# Patient Record
Sex: FEMALE | Race: WHITE | NOT HISPANIC OR LATINO | Employment: OTHER | URBAN - METROPOLITAN AREA
[De-identification: names, ages, dates, MRNs, and addresses within clinical notes are randomized per-mention and may not be internally consistent; named-entity substitution may affect disease eponyms.]

---

## 2023-06-19 ENCOUNTER — TELEPHONE (OUTPATIENT)
Dept: PRIMARY CARE | Facility: CLINIC | Age: 73
End: 2023-06-19
Payer: MEDICARE

## 2023-06-20 DIAGNOSIS — R79.89 ABNORMAL TSH: ICD-10-CM

## 2023-06-20 DIAGNOSIS — E78.5 DYSLIPIDEMIA: Primary | ICD-10-CM

## 2023-06-20 DIAGNOSIS — R73.9 BORDERLINE HYPERGLYCEMIA: ICD-10-CM

## 2023-06-20 DIAGNOSIS — I10 BENIGN ESSENTIAL HYPERTENSION: ICD-10-CM

## 2023-06-20 DIAGNOSIS — E55.9 VITAMIN D DEFICIENCY: ICD-10-CM

## 2023-06-28 ENCOUNTER — TELEPHONE (OUTPATIENT)
Dept: PRIMARY CARE | Facility: CLINIC | Age: 73
End: 2023-06-28
Payer: MEDICARE

## 2023-06-30 DIAGNOSIS — E55.9 VITAMIN D DEFICIENCY: ICD-10-CM

## 2023-06-30 DIAGNOSIS — E78.5 DYSLIPIDEMIA: Primary | ICD-10-CM

## 2023-06-30 DIAGNOSIS — R73.9 BORDERLINE HYPERGLYCEMIA: ICD-10-CM

## 2023-06-30 DIAGNOSIS — R79.89 ABNORMAL TSH: ICD-10-CM

## 2023-07-18 ENCOUNTER — CLINICAL SUPPORT (OUTPATIENT)
Dept: PRIMARY CARE | Facility: CLINIC | Age: 73
End: 2023-07-18
Payer: MEDICARE

## 2023-07-18 ENCOUNTER — OFFICE VISIT (OUTPATIENT)
Dept: PRIMARY CARE | Facility: CLINIC | Age: 73
End: 2023-07-18
Payer: MEDICARE

## 2023-07-18 DIAGNOSIS — E55.9 VITAMIN D DEFICIENCY: ICD-10-CM

## 2023-07-18 DIAGNOSIS — F32.9 TREATMENT-RESISTANT DEPRESSION: Primary | ICD-10-CM

## 2023-07-18 DIAGNOSIS — E78.5 DYSLIPIDEMIA: ICD-10-CM

## 2023-07-18 DIAGNOSIS — R79.89 ABNORMAL TSH: ICD-10-CM

## 2023-07-18 DIAGNOSIS — R73.9 BORDERLINE HYPERGLYCEMIA: ICD-10-CM

## 2023-07-18 DIAGNOSIS — F41.1 GENERALIZED ANXIETY DISORDER: ICD-10-CM

## 2023-07-18 LAB
ALANINE AMINOTRANSFERASE (SGPT) (U/L) IN SER/PLAS: 13 U/L (ref 7–45)
ALBUMIN (G/DL) IN SER/PLAS: 4 G/DL (ref 3.4–5)
ALKALINE PHOSPHATASE (U/L) IN SER/PLAS: 71 U/L (ref 33–136)
ANION GAP IN SER/PLAS: 12 MMOL/L (ref 10–20)
ASPARTATE AMINOTRANSFERASE (SGOT) (U/L) IN SER/PLAS: 19 U/L (ref 9–39)
BASOPHILS (10*3/UL) IN BLOOD BY AUTOMATED COUNT: 0.1 X10E9/L (ref 0–0.1)
BASOPHILS/100 LEUKOCYTES IN BLOOD BY AUTOMATED COUNT: 1.7 % (ref 0–2)
BILIRUBIN TOTAL (MG/DL) IN SER/PLAS: 0.7 MG/DL (ref 0–1.2)
CALCIDIOL (25 OH VITAMIN D3) (NG/ML) IN SER/PLAS: 57 NG/ML
CALCIUM (MG/DL) IN SER/PLAS: 9.7 MG/DL (ref 8.6–10.6)
CARBON DIOXIDE, TOTAL (MMOL/L) IN SER/PLAS: 31 MMOL/L (ref 21–32)
CHLORIDE (MMOL/L) IN SER/PLAS: 104 MMOL/L (ref 98–107)
CHOLESTEROL (MG/DL) IN SER/PLAS: 283 MG/DL (ref 0–199)
CHOLESTEROL IN HDL (MG/DL) IN SER/PLAS: 76.9 MG/DL
CHOLESTEROL/HDL RATIO: 3.7
CREATININE (MG/DL) IN SER/PLAS: 0.75 MG/DL (ref 0.5–1.05)
EOSINOPHILS (10*3/UL) IN BLOOD BY AUTOMATED COUNT: 0.16 X10E9/L (ref 0–0.7)
EOSINOPHILS/100 LEUKOCYTES IN BLOOD BY AUTOMATED COUNT: 2.8 % (ref 0–6)
ERYTHROCYTE DISTRIBUTION WIDTH (RATIO) BY AUTOMATED COUNT: 12 % (ref 11.5–14.5)
ERYTHROCYTE MEAN CORPUSCULAR HEMOGLOBIN CONCENTRATION (G/DL) BY AUTOMATED: 31.6 G/DL (ref 32–36)
ERYTHROCYTE MEAN CORPUSCULAR VOLUME (FL) BY AUTOMATED COUNT: 99 FL (ref 80–100)
ERYTHROCYTES (10*6/UL) IN BLOOD BY AUTOMATED COUNT: 4.35 X10E12/L (ref 4–5.2)
ESTIMATED AVERAGE GLUCOSE FOR HBA1C: 114 MG/DL
GFR FEMALE: 85 ML/MIN/1.73M2
GLUCOSE (MG/DL) IN SER/PLAS: 97 MG/DL (ref 74–99)
HEMATOCRIT (%) IN BLOOD BY AUTOMATED COUNT: 43 % (ref 36–46)
HEMOGLOBIN (G/DL) IN BLOOD: 13.6 G/DL (ref 12–16)
HEMOGLOBIN A1C/HEMOGLOBIN TOTAL IN BLOOD: 5.6 %
IMMATURE GRANULOCYTES/100 LEUKOCYTES IN BLOOD BY AUTOMATED COUNT: 0.2 % (ref 0–0.9)
LDL: 184 MG/DL (ref 0–99)
LEUKOCYTES (10*3/UL) IN BLOOD BY AUTOMATED COUNT: 5.8 X10E9/L (ref 4.4–11.3)
LYMPHOCYTES (10*3/UL) IN BLOOD BY AUTOMATED COUNT: 2.06 X10E9/L (ref 1.2–4.8)
LYMPHOCYTES/100 LEUKOCYTES IN BLOOD BY AUTOMATED COUNT: 35.8 % (ref 13–44)
MONOCYTES (10*3/UL) IN BLOOD BY AUTOMATED COUNT: 0.39 X10E9/L (ref 0.1–1)
MONOCYTES/100 LEUKOCYTES IN BLOOD BY AUTOMATED COUNT: 6.8 % (ref 2–10)
NEUTROPHILS (10*3/UL) IN BLOOD BY AUTOMATED COUNT: 3.04 X10E9/L (ref 1.2–7.7)
NEUTROPHILS/100 LEUKOCYTES IN BLOOD BY AUTOMATED COUNT: 52.7 % (ref 40–80)
NRBC (PER 100 WBCS) BY AUTOMATED COUNT: 0 /100 WBC (ref 0–0)
PLATELETS (10*3/UL) IN BLOOD AUTOMATED COUNT: 336 X10E9/L (ref 150–450)
POTASSIUM (MMOL/L) IN SER/PLAS: 4.3 MMOL/L (ref 3.5–5.3)
PROTEIN TOTAL: 6.4 G/DL (ref 6.4–8.2)
SODIUM (MMOL/L) IN SER/PLAS: 143 MMOL/L (ref 136–145)
THYROTROPIN (MIU/L) IN SER/PLAS BY DETECTION LIMIT <= 0.05 MIU/L: 4.23 MIU/L (ref 0.44–3.98)
THYROXINE (T4) FREE (NG/DL) IN SER/PLAS: 1.08 NG/DL (ref 0.78–1.48)
TRIGLYCERIDE (MG/DL) IN SER/PLAS: 110 MG/DL (ref 0–149)
UREA NITROGEN (MG/DL) IN SER/PLAS: 18 MG/DL (ref 6–23)
VLDL: 22 MG/DL (ref 0–40)

## 2023-07-18 PROCEDURE — 83036 HEMOGLOBIN GLYCOSYLATED A1C: CPT

## 2023-07-18 PROCEDURE — 99214 OFFICE O/P EST MOD 30 MIN: CPT | Performed by: INTERNAL MEDICINE

## 2023-07-18 PROCEDURE — 84443 ASSAY THYROID STIM HORMONE: CPT

## 2023-07-18 PROCEDURE — 82306 VITAMIN D 25 HYDROXY: CPT

## 2023-07-18 PROCEDURE — 84439 ASSAY OF FREE THYROXINE: CPT

## 2023-07-18 PROCEDURE — 85025 COMPLETE CBC W/AUTO DIFF WBC: CPT

## 2023-07-18 PROCEDURE — 80061 LIPID PANEL: CPT

## 2023-07-18 PROCEDURE — 80053 COMPREHEN METABOLIC PANEL: CPT

## 2023-07-18 RX ORDER — ARIPIPRAZOLE 2 MG/1
TABLET ORAL
Qty: 30 TABLET | Refills: 0 | Status: SHIPPED | OUTPATIENT
Start: 2023-07-18 | End: 2023-07-18 | Stop reason: SDUPTHER

## 2023-07-18 RX ORDER — ARIPIPRAZOLE 2 MG/1
TABLET ORAL
Qty: 30 TABLET | Refills: 0 | Status: SHIPPED
Start: 2023-07-18 | End: 2023-08-18

## 2023-07-18 NOTE — PROGRESS NOTES
"    Taylor Kerns is a 71 yo F presenting for acute visit for mood disorder.   CPE/MCR 8.17.22.     Presents to discuss concerns with anxiety, depression and insomnia.   Longstanding.   Wakes up anxious.   Components of both early and middle insomnia.     Doesn't \"like\" conventional meds - \"takes a long time to kick in\" plus side effects that are uncomfortable like heightened anxiety or feeling hyper.     Sertraline most recently.   Fluoxetine for many years remotely   Wellbutrin.   Tried some others.     Taking lorazepam 1 mg 1/2 tab qhs via [place] PCP.     Goes to bed at midnight; wakes around 6-7am. Approx 6 hours of sleep.     Maximizing exercise and meditative practice.         1. TELMA, insomnia   -sertraline in the past   -rare prn lorazepam - 1 mg 10.22 30 tabs and 9.22     2. External hemorrhoids     3. DLD, med averse   -CAC zero in 9.22    4. Subclinical hypothyroidism   -no meds     5. Endometrial ca s/p NATALIYA BSO,  on estraediol off for some time   -FU gyne onc     6. BCC, FU derm     7. IBS, quiescient     8. Knee pain s/p MRI (.22 complex meniscal tear and cartilage loss      #HM   -tdap 2016; pneumovax UTD, ?shingrix  -screen labs due and ordered   -mammo 10.7.22      66\"   123 in 8.22      [place]: AoX3 NAD   Psych nl mood nl anxiety         "

## 2023-07-18 NOTE — PATIENT INSTRUCTIONS
Taylor,     Today we discussed the concepts of treatment-resistant depression and anxiety with a history of using multiple classic medications either without significant improvement or with side effects. I do think alternate meds are worth considering versus ketamine as you mentioned as an alternative.      The psychiatrist I'd like to refer to you:   Dr. Surinder Manzano [address] [facility] [address]  [address] [place] [address]  [phone]    [facility]  [address] [place] [address] [phone]        You are also due for full lab work which I have ordered.     Your last mammogram was 10-7-22 and you can defer this to every other year if you prefer, though based on our newest guidelines, every year until age 75 is preferable.     We will start Abilify (aripiprazole) 2 mg at bedtime. If no effect after 1-2 weeks let me know and we can increase the dose. Typical doses for depression/anxiety can be 5, 10 or 15 mg once daily at bedtime.       CL

## 2023-08-18 ENCOUNTER — OFFICE VISIT (OUTPATIENT)
Dept: PRIMARY CARE | Facility: CLINIC | Age: 73
End: 2023-08-18
Payer: MEDICARE

## 2023-08-18 VITALS
WEIGHT: 122 LBS | DIASTOLIC BLOOD PRESSURE: 69 MMHG | HEART RATE: 76 BPM | BODY MASS INDEX: 19.61 KG/M2 | HEIGHT: 66 IN | SYSTOLIC BLOOD PRESSURE: 119 MMHG

## 2023-08-18 DIAGNOSIS — Z12.31 BREAST CANCER SCREENING BY MAMMOGRAM: Primary | ICD-10-CM

## 2023-08-18 PROCEDURE — 1170F FXNL STATUS ASSESSED: CPT | Performed by: INTERNAL MEDICINE

## 2023-08-18 PROCEDURE — G0439 PPPS, SUBSEQ VISIT: HCPCS | Performed by: INTERNAL MEDICINE

## 2023-08-18 ASSESSMENT — ENCOUNTER SYMPTOMS
DEPRESSION: 0
LOSS OF SENSATION IN FEET: 0
OCCASIONAL FEELINGS OF UNSTEADINESS: 0

## 2023-08-18 ASSESSMENT — ACTIVITIES OF DAILY LIVING (ADL)
DOING_HOUSEWORK: INDEPENDENT
BATHING: INDEPENDENT
GROCERY_SHOPPING: INDEPENDENT
MANAGING_FINANCES: INDEPENDENT
DRESSING: INDEPENDENT
TAKING_MEDICATION: INDEPENDENT

## 2023-08-18 ASSESSMENT — PATIENT HEALTH QUESTIONNAIRE - PHQ9
1. LITTLE INTEREST OR PLEASURE IN DOING THINGS: NOT AT ALL
2. FEELING DOWN, DEPRESSED OR HOPELESS: NOT AT ALL
SUM OF ALL RESPONSES TO PHQ9 QUESTIONS 1 AND 2: 0

## 2023-08-18 NOTE — PROGRESS NOTES
"Taylor Kerns is a 74 yo F presenting for CPE/MCR wellness and in FU to 7.18.22 acute visit for mood disorder.        1. TELMA, insomnia   -sertraline, fluoxetine, wellbutrin in the past without efficacy and with intolerances like inc anxiety or activation   -rare prn lorazepam - 1 mg 10.22 30 tabs and 9.22   -last visit 7.23 trialed aripiprazole at bedtime   -then saw psych, abilify uptitrated to 4 and oleg trialed and vilazodone recommended, but too expensive  -started abilify and started to feel better but then felt sensation of this plateauing   -still using lorazepam at bedtime per [place] PCP     2. External hemorrhoids, internal hemorrhoids s/p banding procedure done in [place] in 2.2023 via colonoscopy      3. DLD, med averse - ASCVD this year 9.7% to optimal 7.7%   -7.23 labs with  from 249 previously and  from 154 previously  -CAC zero in 9.22   -discussed at length, not int in meds at this time     4. Subclinical hypothyroidism   -no meds   -TSH 4.23 in 7.2023. stable      5. Endometrial ca s/p NATALIYA BSO,  on estraediol off for some time   -FU gyne onc      6. BCC, FU derm      7. IBS, quiescient      8. Knee pain s/p MRI (.22 complex meniscal tear and cartilage loss     9. Some hair loss in interval   -screen labs 7.2023 reviewed   -discussed consider nutrafol and topical minoxidil      #HM   -tdap 2016; pneumovax UTD, ?shingrix  -screen labs 7.23  -mammo 10.7.22 - due 10.7.23  -cscope 2.2023 in [place], unsure if 5 or 10 years screening interval        66\"   122 lbs       Gen Aox3 NAD well appearing   HEENT mmm pharynx clear no cervical LAD   Eyes sclerae clear   CV rrr nl s1/2 no m/r/g  Pulm CTAB no adventitious sounds   Ext warm dry no edema 2+ DP pulse     "

## 2023-09-27 PROBLEM — R79.83 HOMOCYSTEINEMIA: Status: ACTIVE | Noted: 2018-07-02

## 2023-09-27 PROBLEM — M54.50 CHRONIC LOW BACK PAIN: Status: ACTIVE | Noted: 2023-09-27

## 2023-09-27 PROBLEM — C44.612 BASAL CELL CARCINOMA OF SKIN OF RIGHT UPPER LIMB, INCLUDING SHOULDER: Status: ACTIVE | Noted: 2023-08-07

## 2023-09-27 PROBLEM — F51.04 CHRONIC INSOMNIA: Status: ACTIVE | Noted: 2023-09-27

## 2023-09-27 PROBLEM — Z85.42 HISTORY OF UTERINE CANCER: Status: ACTIVE | Noted: 2019-09-11

## 2023-09-27 PROBLEM — R76.8 SCL-70 ANTIBODY POSITIVE: Status: ACTIVE | Noted: 2018-07-02

## 2023-09-27 PROBLEM — F41.8 DEPRESSION WITH ANXIETY: Status: ACTIVE | Noted: 2023-09-27

## 2023-09-27 PROBLEM — K64.8 BLEEDING INTERNAL HEMORRHOIDS: Status: ACTIVE | Noted: 2023-01-17

## 2023-09-27 PROBLEM — R94.31 ABNORMAL ECG: Status: ACTIVE | Noted: 2023-09-27

## 2023-09-27 PROBLEM — M85.80 OSTEOPENIA: Status: ACTIVE | Noted: 2023-09-27

## 2023-09-27 PROBLEM — E78.5 DYSLIPIDEMIA: Status: ACTIVE | Noted: 2018-06-01

## 2023-09-27 PROBLEM — N95.1 MENOPAUSAL VASOMOTOR SYNDROME: Status: ACTIVE | Noted: 2023-09-27

## 2023-09-27 PROBLEM — E53.8 FOLATE DEFICIENCY: Status: ACTIVE | Noted: 2018-07-02

## 2023-09-27 PROBLEM — R79.89 HIGH SERUM FIBRINOGEN: Status: ACTIVE | Noted: 2018-07-02

## 2023-09-27 PROBLEM — D22.4 MELANOCYTIC NEVI OF SCALP AND NECK: Status: ACTIVE | Noted: 2023-08-07

## 2023-09-27 PROBLEM — E78.5 BORDERLINE HYPERLIPIDEMIA: Status: ACTIVE | Noted: 2023-09-27

## 2023-09-27 PROBLEM — L91.8 OTHER HYPERTROPHIC DISORDERS OF THE SKIN: Status: ACTIVE | Noted: 2023-08-07

## 2023-09-27 PROBLEM — G89.29 CHRONIC LOW BACK PAIN: Status: ACTIVE | Noted: 2023-09-27

## 2023-09-27 PROBLEM — C44.311 BASAL CELL CARCINOMA OF SKIN OF NOSE: Status: ACTIVE | Noted: 2023-08-07

## 2023-09-27 PROBLEM — B07.8 OTHER VIRAL WARTS: Status: ACTIVE | Noted: 2023-08-07

## 2023-09-27 PROBLEM — L57.0 ACTINIC KERATOSIS: Status: ACTIVE | Noted: 2023-08-07

## 2023-09-27 PROBLEM — K64.9 HEMORRHOIDS: Status: ACTIVE | Noted: 2023-09-27

## 2023-09-27 PROBLEM — L81.4 OTHER MELANIN HYPERPIGMENTATION: Status: ACTIVE | Noted: 2023-08-07

## 2023-09-27 PROBLEM — D64.9 ANEMIA: Status: ACTIVE | Noted: 2018-06-01

## 2023-09-27 PROBLEM — C44.519 BASAL CELL CARCINOMA OF SKIN OF OTHER PART OF TRUNK: Status: ACTIVE | Noted: 2023-08-07

## 2023-09-27 PROBLEM — N84.0 POLYP OF CORPUS UTERI: Status: ACTIVE | Noted: 2017-12-05

## 2023-09-27 PROBLEM — K62.3 RECTAL PROLAPSE: Status: ACTIVE | Noted: 2023-01-17

## 2023-09-27 PROBLEM — M54.2 CERVICALGIA: Status: ACTIVE | Noted: 2023-09-27

## 2023-09-27 PROBLEM — E53.8 B12 DEFICIENCY: Status: ACTIVE | Noted: 2018-07-02

## 2023-09-27 PROBLEM — Z15.89 HETEROZYGOUS MTHFR MUTATION C677T: Status: ACTIVE | Noted: 2018-07-02

## 2023-09-27 PROBLEM — E03.8 SUBCLINICAL HYPOTHYROIDISM: Status: ACTIVE | Noted: 2023-09-27

## 2023-09-27 PROBLEM — D48.5 NEOPLASM OF UNCERTAIN BEHAVIOR OF SKIN: Status: ACTIVE | Noted: 2023-08-07

## 2023-09-27 PROBLEM — L57.9 SKIN CHANGES DUE TO CHRONIC EXPOSURE TO NONIONIZING RADIATION, UNSPECIFIED: Status: ACTIVE | Noted: 2023-08-07

## 2023-09-27 PROBLEM — E03.9 ACQUIRED HYPOTHYROIDISM: Status: ACTIVE | Noted: 2018-07-02

## 2023-09-27 PROBLEM — E63.0 ESSENTIAL FATTY ACID (EFA) DEFICIENCY: Status: ACTIVE | Noted: 2018-07-02

## 2023-09-27 PROBLEM — Z15.89: Status: ACTIVE | Noted: 2018-07-02

## 2023-09-27 PROBLEM — M99.09 SEGMENTAL AND SOMATIC DYSFUNCTION: Status: ACTIVE | Noted: 2023-09-27

## 2023-09-27 PROBLEM — M79.18 MYALGIA, OTHER SITE: Status: ACTIVE | Noted: 2023-09-27

## 2023-09-27 RX ORDER — AMITRIPTYLINE HYDROCHLORIDE 25 MG/1
25 TABLET, FILM COATED ORAL NIGHTLY
COMMUNITY
Start: 2023-05-15 | End: 2023-10-19 | Stop reason: ALTCHOICE

## 2023-09-27 RX ORDER — SENNOSIDES 25 MG/1
TABLET, FILM COATED ORAL 2 TIMES DAILY PRN
COMMUNITY
Start: 2023-01-24 | End: 2023-10-19 | Stop reason: ALTCHOICE

## 2023-09-27 RX ORDER — ESTRADIOL 0.03 MG/D
1 PATCH TRANSDERMAL
COMMUNITY
Start: 2020-09-22 | End: 2023-10-19 | Stop reason: ALTCHOICE

## 2023-09-27 RX ORDER — LORAZEPAM 1 MG/1
1 TABLET ORAL 3 TIMES DAILY PRN
COMMUNITY
Start: 2021-08-16 | End: 2023-10-19 | Stop reason: SDUPTHER

## 2023-09-27 RX ORDER — GABAPENTIN 100 MG/1
100 CAPSULE ORAL
COMMUNITY
Start: 2023-08-15 | End: 2023-10-19 | Stop reason: SDUPTHER

## 2023-09-27 RX ORDER — SERTRALINE HYDROCHLORIDE 100 MG/1
100 TABLET, FILM COATED ORAL
COMMUNITY
Start: 2019-09-11 | End: 2023-10-19 | Stop reason: ALTCHOICE

## 2023-09-27 RX ORDER — VILAZODONE HYDROCHLORIDE 10 MG/1
1 TABLET ORAL DAILY
COMMUNITY
Start: 2023-08-15 | End: 2023-10-19 | Stop reason: SDUPTHER

## 2023-09-27 RX ORDER — HYDROCORTISONE 25 MG/G
CREAM TOPICAL
COMMUNITY
End: 2023-10-19 | Stop reason: ALTCHOICE

## 2023-09-27 RX ORDER — HYDROXYZINE HYDROCHLORIDE 25 MG/1
12.5 TABLET, FILM COATED ORAL 2 TIMES DAILY
COMMUNITY
Start: 2022-11-01 | End: 2023-10-19 | Stop reason: ALTCHOICE

## 2023-09-27 RX ORDER — ARIPIPRAZOLE 2 MG/1
1 TABLET ORAL DAILY
COMMUNITY
End: 2023-10-19 | Stop reason: SDUPTHER

## 2023-09-27 RX ORDER — OMEPRAZOLE 20 MG/1
20 CAPSULE, DELAYED RELEASE ORAL AS NEEDED
COMMUNITY
End: 2023-10-19 | Stop reason: ALTCHOICE

## 2023-09-27 RX ORDER — BENZONATATE 100 MG/1
200 CAPSULE ORAL 3 TIMES DAILY PRN
COMMUNITY
Start: 2017-01-23 | End: 2023-10-19 | Stop reason: ALTCHOICE

## 2023-09-27 RX ORDER — TRETINOIN 1 MG/G
CREAM TOPICAL NIGHTLY
COMMUNITY
Start: 2022-10-03

## 2023-09-27 RX ORDER — ESOMEPRAZOLE MAGNESIUM 40 MG/1
40 CAPSULE, DELAYED RELEASE ORAL
COMMUNITY
Start: 2012-06-04 | End: 2023-10-19 | Stop reason: ALTCHOICE

## 2023-09-27 RX ORDER — FLUOXETINE HYDROCHLORIDE 20 MG/1
20 CAPSULE ORAL DAILY
COMMUNITY
Start: 2016-09-13 | End: 2023-10-19 | Stop reason: ALTCHOICE

## 2023-09-27 RX ORDER — TRIAMCINOLONE ACETONIDE 40 MG/ML
40 INJECTION, SUSPENSION INTRA-ARTICULAR; INTRAMUSCULAR
COMMUNITY
Start: 2019-09-12 | End: 2023-10-19 | Stop reason: ALTCHOICE

## 2023-09-27 RX ORDER — IBUPROFEN 800 MG/1
800 TABLET ORAL EVERY 6 HOURS
COMMUNITY
Start: 2022-09-14 | End: 2023-10-19 | Stop reason: ALTCHOICE

## 2023-09-27 RX ORDER — MELOXICAM 7.5 MG/1
7.5 TABLET ORAL
COMMUNITY
Start: 2019-08-12 | End: 2023-10-19 | Stop reason: ALTCHOICE

## 2023-09-27 RX ORDER — AMITRIPTYLINE HYDROCHLORIDE 10 MG/1
1 TABLET, FILM COATED ORAL NIGHTLY
COMMUNITY
Start: 2023-07-10 | End: 2023-10-19 | Stop reason: ALTCHOICE

## 2023-09-27 RX ORDER — HYDROCORTISONE ACETATE 25 MG/1
25 SUPPOSITORY RECTAL
COMMUNITY
Start: 2023-01-21 | End: 2023-10-19 | Stop reason: ALTCHOICE

## 2023-09-27 RX ORDER — LUBIPROSTONE 24 UG/1
24 CAPSULE ORAL
COMMUNITY

## 2023-10-02 ENCOUNTER — ALLIED HEALTH (OUTPATIENT)
Dept: INTEGRATIVE MEDICINE | Facility: CLINIC | Age: 73
End: 2023-10-02
Payer: MEDICARE

## 2023-10-02 DIAGNOSIS — M54.9 DORSALGIA: Primary | ICD-10-CM

## 2023-10-02 PROCEDURE — 97810 ACUP 1/> WO ESTIM 1ST 15 MIN: CPT | Performed by: ACUPUNCTURIST

## 2023-10-02 PROCEDURE — 97811 ACUP 1/> W/O ESTIM EA ADD 15: CPT | Performed by: ACUPUNCTURIST

## 2023-10-02 NOTE — PROGRESS NOTES
Acupuncture Visit:     Subjective   Patient ID: Taylor Kerns is a 73 y.o. female who presents for Back Pain (Chronic/)  States her low back has been doing great.   She is pleased with acupuncture.        Session Information  Is this acupuncture treatment being billed to the patient's insurance company: Yes         Review of Systems         Provider reviewed plan for the acupuncture session, precautions and contraindications. Patient/guardian/hospital staff has given consent to treat with full understanding of what to expect during the session. Before acupuncture began, provider explained to the patient to communicate at any time if the procedure was causing discomfort past their tolerance level. Patient agreed to advise acupuncturist. The acupuncturist counseled the patient on the risks of acupuncture treatment including pain, infection, bleeding, and no relief of pain. The patient was positioned comfortably. There was no evidence of infection at the site of needle insertions.    Objective   Physical Exam              Acupuncture Treatment  Patient Position: Prone on a table  Acupuncture Needling: Yes  Body Points: With retention  Body Points - Bilateral: Lv 3, Kd 3, 7, GB 34, SI 3, UB 11, 14, 23-26  Auricular Points: No  Electroacupuncture Used: No  Other Techniques Utilized: TDP Lamp  TDP Lamp Descripton: low back pain with PSO  Needle Count In: 22  Needle Count Out: 22  Total Face to Face Time (min): 30 minutes    Acupuncture Evaluation / Recommendation(s) / Follow-up  Follow-up: 1 wk         Assessment/Plan

## 2023-10-09 ENCOUNTER — HOSPITAL ENCOUNTER (OUTPATIENT)
Dept: RADIOLOGY | Facility: HOSPITAL | Age: 73
Discharge: HOME | End: 2023-10-09
Payer: MEDICARE

## 2023-10-09 DIAGNOSIS — Z12.31 ENCOUNTER FOR SCREENING MAMMOGRAM FOR MALIGNANT NEOPLASM OF BREAST: ICD-10-CM

## 2023-10-09 PROCEDURE — 77063 BREAST TOMOSYNTHESIS BI: CPT | Mod: 50

## 2023-10-09 PROCEDURE — 77063 BREAST TOMOSYNTHESIS BI: CPT | Mod: BILATERAL PROCEDURE | Performed by: RADIOLOGY

## 2023-10-09 PROCEDURE — 77067 SCR MAMMO BI INCL CAD: CPT | Mod: BILATERAL PROCEDURE | Performed by: RADIOLOGY

## 2023-10-09 PROCEDURE — 77067 SCR MAMMO BI INCL CAD: CPT

## 2023-10-11 ENCOUNTER — APPOINTMENT (OUTPATIENT)
Dept: INTEGRATIVE MEDICINE | Facility: CLINIC | Age: 73
End: 2023-10-11
Payer: MEDICARE

## 2023-10-17 NOTE — PROGRESS NOTES
Subjective   Patient ID: Taylor Kerns is a 73 y.o. female who presents October 18, 2023 for lower back and neck pain.    MCR    Today, the patient rates their degree of pain as a 4 out of 10 on the numeric pain rating scale.     HPI : Taylor presents to my office with main complaint of lower back pain. She reports that she recently drove to/from [place] to visit family, involving 8 hours in the car. She reports some increased lower back discomfort after this despite taking breaks to stop, stretch and move. Regular exercise classes have continued to be helpful. Secondary complaint of neck discomfort. No radicular complaints reported. Will be returning to her condo in [place] in mid-November for numerous months.      Objective   Physical Exam  Neurological:      General: No focal deficit present.      Mental Status: She is alert and oriented to person, place, and time.      Cranial Nerves: No dysarthria or facial asymmetry.      Sensory: Sensation is intact.      Motor: Motor function is intact.      Coordination: Coordination is intact.      Gait: Gait is intact. Gait normal.       Palpation of the following region(s) revealed:  Cervical: Upper trapezius bilateral, muscular hypertonicity.  Cervical paraspinals bilateral, muscular hypertonicity.  Thoracic: Thoracic paraspinals bilateral, muscular hypertonicity.  Middle trapezius bilateral, muscular hypertonicity.  Pectoralis bilateral, muscular hypertonicity.  Lumbar: Lumbar paraspinals right, hypertonicity and tenderness.  Quadratus lumborum bilateral, hypertonicity and tenderness.  Psoas bilateral, muscular hypertonicity.        Segmental Joint(s): Segmental joint dysfunction was assessed with motion palpation and is identified in the following areas:  Cervical : C4 C5  Thoracic : T3, T4, and T7  Lumbopelvic / Sacral SIJ : L4, L5/S1, and R SIJ      Assessment/Plan   Today's Treatment Included: Chiropractic manipulation to the Segmental Joint(s) Cervical : C4  C5 Segmental Joint(s) Lumbopelvic/Sacral SIJ : L4, L5/S1, and R SIJ Segmental Joint(s) Thoracic : T3, T4, and T7   Treatment Techniques Used : Direct Non-force technique, Activator/Tool assisted technique, and Pelvic drop table technique    Soft-tissue mobilization was performed in the following areas:   Cervical paraspinal mm. bilateral and Upper Trapezius bilateral  Thoracic Paraspinal mm. bilateral, Rhomboids bilateral, and Pectoralis bilateral  Lumbar Paraspinal mm. bilateral, Quadratus Lumborum bilateral, and Psoas bilateral            F/U as-needed or upon return from [place]    The patient tolerated today's treatment with little or no additional discomfort and was instructed to contact the office for questions or concerns.

## 2023-10-18 ENCOUNTER — ALLIED HEALTH (OUTPATIENT)
Dept: INTEGRATIVE MEDICINE | Facility: CLINIC | Age: 73
End: 2023-10-18
Payer: MEDICARE

## 2023-10-18 DIAGNOSIS — M79.18 MYALGIA, OTHER SITE: ICD-10-CM

## 2023-10-18 DIAGNOSIS — M54.2 CERVICALGIA: ICD-10-CM

## 2023-10-18 DIAGNOSIS — M99.03 SEGMENTAL AND SOMATIC DYSFUNCTION OF LUMBAR REGION: Primary | ICD-10-CM

## 2023-10-18 DIAGNOSIS — M99.01 SEGMENTAL DYSFUNCTION OF CERVICAL REGION: ICD-10-CM

## 2023-10-18 DIAGNOSIS — M54.50 CHRONIC BILATERAL LOW BACK PAIN WITHOUT SCIATICA: ICD-10-CM

## 2023-10-18 DIAGNOSIS — M99.02 SEGMENTAL AND SOMATIC DYSFUNCTION OF THORACIC REGION: ICD-10-CM

## 2023-10-18 DIAGNOSIS — G89.29 CHRONIC BILATERAL LOW BACK PAIN WITHOUT SCIATICA: ICD-10-CM

## 2023-10-18 PROCEDURE — 98941 CHIROPRACT MANJ 3-4 REGIONS: CPT | Performed by: CHIROPRACTOR

## 2023-10-19 ENCOUNTER — TELEMEDICINE (OUTPATIENT)
Dept: BEHAVIORAL HEALTH | Facility: CLINIC | Age: 73
End: 2023-10-19
Payer: MEDICARE

## 2023-10-19 DIAGNOSIS — F51.04 CHRONIC INSOMNIA: ICD-10-CM

## 2023-10-19 DIAGNOSIS — F33.41 RECURRENT MAJOR DEPRESSIVE DISORDER, IN PARTIAL REMISSION (CMS-HCC): Primary | ICD-10-CM

## 2023-10-19 DIAGNOSIS — F41.9 ANXIETY: ICD-10-CM

## 2023-10-19 PROCEDURE — 99215 OFFICE O/P EST HI 40 MIN: CPT | Performed by: PSYCHIATRY & NEUROLOGY

## 2023-10-19 RX ORDER — LORAZEPAM 0.5 MG/1
0.25 TABLET ORAL NIGHTLY PRN
Qty: 15 TABLET | Refills: 0
Start: 2023-10-19 | End: 2023-11-18

## 2023-10-19 RX ORDER — HYDROQUINONE 40 MG/G
CREAM TOPICAL
COMMUNITY
Start: 2023-10-17

## 2023-10-19 RX ORDER — VILAZODONE HYDROCHLORIDE 10 MG/1
10 TABLET ORAL DAILY
Qty: 30 TABLET | Refills: 0
Start: 2023-10-19 | End: 2023-11-09 | Stop reason: ALTCHOICE

## 2023-10-19 RX ORDER — ARIPIPRAZOLE 2 MG/1
2 TABLET ORAL DAILY
Qty: 30 TABLET | Refills: 0
Start: 2023-10-19 | End: 2023-12-20 | Stop reason: SDUPTHER

## 2023-10-19 RX ORDER — GABAPENTIN 100 MG/1
100 CAPSULE ORAL NIGHTLY PRN
Qty: 30 CAPSULE | Refills: 0
Start: 2023-10-19 | End: 2023-12-20 | Stop reason: SDUPTHER

## 2023-10-19 ASSESSMENT — ENCOUNTER SYMPTOMS
NERVOUS/ANXIOUS: 0
DYSPHORIC MOOD: 0

## 2023-10-19 NOTE — PROGRESS NOTES
"Adult Ambulatory Psychiatry Progress Note      Assessment/Plan     Impression:  Taylor Kerns is a 73 y.o. female domiciled alone in [place] but staying with brother in [place], retired who presents for follow up with CC of Depression, Anxiety, and Insomnia.     Depression - abilify 2mg daily, will sent savings form for vilazodone 10mg  Anxiety/insomnia - gabapentin 100-300mg qhs, change ativan 0.25mg to PRN to reduce sedation     Plan:   Medication:  Diagnoses and all orders for this visit:  Recurrent major depressive disorder, in partial remission (CMS/HCC)  -     ARIPiprazole (Abilify) 2 mg tablet; Take 1 tablet (2 mg) by mouth once daily.  -     vilazodone (Viibryd) 10 mg tablet; Take 1 tablet (10 mg) by mouth once daily.  Anxiety  -     LORazepam (Ativan) 0.5 mg tablet; Take 0.5 tablets (0.25 mg) by mouth as needed at bedtime for sleep. Managed by PCP  -     vilazodone (Viibryd) 10 mg tablet; Take 1 tablet (10 mg) by mouth once daily.  Chronic insomnia  -     gabapentin (Neurontin) 100 mg capsule; Take 1 capsule (100 mg) by mouth as needed at bedtime (insomnia).        Subjective   HPI:  Pt arrived on time. The viibryd was delivered but she went on a trip and didn't start it. Mood \"so-so, it's ok\". Her anxiety is \"definitely better\". Her sleep is better. She's taking ativan 0.25mg daily with gabapentin and it's working. Her energy is \"just ok\" and so is her mood. From the outside she seems good but it's because she has to push herself. The motivation takes effort to make happen. She wants to want to do something instead of \"pushing through\". She's leaving for [place] before Thanksgiving and won't be back until next summer. Suggested establishing care there for ongoing care. Advised can't dispense medical advise while she's out of state but can provide refills. Discussed TMS and spravato as options for depression.          Review of Systems   Psychiatric/Behavioral:  Negative for dysphoric mood and suicidal " "ideas. The patient is not nervous/anxious.        OARRS:  Chuck Cantu MD on 10/19/2023  6:12 PM  I have personally reviewed the OARRS report for Taylor Kerns. I have considered the risks of abuse, dependence, addiction and diversion    Is the patient prescribed a combination of a benzodiazepine and opioid?  No    Objective   Mental Status Exam:  General Appearance: Well groomed, appropriate eye contact  Attitude/Behavior: Cooperative  Motor: No psychomotor agitation or retardation, no tremor or other abnormal movements  Speech: Normal rate, volume, prosody  Mood: \"ok\"  Affect: Constricted  Thought Process: Linear, goal directed  Thought Associations: No loosening of associations  Thought Content: Normal  Perception: No perceptual abnormalities noted  Insight: Intact  Judgement: Intact    Vitals:  There were no vitals filed for this visit.    Current Medications:  Current Outpatient Medications on File Prior to Visit   Medication Sig Dispense Refill    hydroquinone 4 % cream Apply as directed  Basilio Hernandez MD      lubiprostone (Amitiza) 24 mcg capsule Take 1 capsule (24 mcg) by mouth.      MULTIVITAMIN ORAL Take by mouth.      tretinoin (Retin-A) 0.1 % cream Apply topically once daily at bedtime.      [DISCONTINUED] amitriptyline (Elavil) 10 mg tablet Take 1 tablet (10 mg) by mouth once daily at bedtime.      [DISCONTINUED] amitriptyline (Elavil) 25 mg tablet Take 1 tablet (25 mg) by mouth once daily at bedtime.      [DISCONTINUED] ARIPiprazole (Abilify) 2 mg tablet Take 1 tablet (2 mg) by mouth once daily.      [DISCONTINUED] benzonatate (Tessalon) 100 mg capsule Take 2 capsules (200 mg) by mouth 3 times a day as needed.      [DISCONTINUED] esomeprazole (NexIUM) 40 mg DR capsule Take 1 capsule (40 mg) by mouth.      [DISCONTINUED] estradiol (Climara) 0.025 mg/24 hr patch Place 1 patch on the skin 1 (one) time per week.      [DISCONTINUED] estradioL-levonorgestreL (ClimaraPRO) 0.045-0.015 mg/24 hr Place 1 " patch on the skin once a week.      [DISCONTINUED] FLUoxetine (PROzac) 20 mg capsule Take 1 capsule (20 mg) by mouth once daily.      [DISCONTINUED] gabapentin (Neurontin) 100 mg capsule Take 1 capsule (100 mg) by mouth.      [DISCONTINUED] hydrocortisone (Anusol-HC) 25 mg suppository Insert 1 suppository (25 mg) into the rectum.      [DISCONTINUED] hydrocortisone 2.5 % cream Apply topically.      [DISCONTINUED] hydrOXYzine HCL (Atarax) 25 mg tablet Take 0.5 tablets (12.5 mg) by mouth 2 times a day.      [DISCONTINUED] ibuprofen 800 mg tablet Take 1 tablet (800 mg) by mouth every 6 hours.      [DISCONTINUED] lidocaine (Xylocaine) 5 % cream cream Apply topically 2 times a day as needed.      [DISCONTINUED] LORazepam (Ativan) 1 mg tablet Take 1 tablet (1 mg) by mouth 3 times a day as needed.      [DISCONTINUED] meloxicam (Mobic) 7.5 mg tablet Take 1 tablet (7.5 mg) by mouth once daily.      [DISCONTINUED] omeprazole (PriLOSEC) 20 mg DR capsule Take 1 capsule (20 mg) by mouth if needed.      [DISCONTINUED] sertraline (Zoloft) 100 mg tablet Take 1 tablet (100 mg) by mouth once daily.      [DISCONTINUED] triamcinolone acetonide (Kenalog-40) 40 mg/mL injection Inject 1 mL (40 mg) into the shoulder, thigh, or buttocks.      [DISCONTINUED] vilazodone (Viibryd) 10 mg tablet Take 1 tablet (10 mg) by mouth once daily.       No current facility-administered medications on file prior to visit.       Time Spent:    Prep time: 3  Direct patient time: 32  Documentation time: 5  Total time: 40 min    Next Appointment:  Follow up in 3 weeks (on 11/9/2023).

## 2023-10-26 ENCOUNTER — APPOINTMENT (OUTPATIENT)
Dept: INTEGRATIVE MEDICINE | Facility: CLINIC | Age: 73
End: 2023-10-26
Payer: MEDICARE

## 2023-11-05 ENCOUNTER — TELEPHONE (OUTPATIENT)
Dept: BEHAVIORAL HEALTH | Facility: CLINIC | Age: 73
End: 2023-11-05
Payer: MEDICARE

## 2023-11-05 NOTE — TELEPHONE ENCOUNTER
"Ms. Taylor Kerns is a 74 YO F w a Past Med Hx of BCC, MTHFR mutation, HLD, Past Psych Hx of TELMA, MDD who contacted the answering service  for insomnia after starting Viibryd 10mg daily approximately 3 weeks ago for MDD.    She says since starting this medication she has experienced insomnia, increased jitteriness and is \" bouncing off the walls\". Consequently, she would like to stop the medication. Also started Abilify 2 mg daily, 3 months ago and believes this medication is helping to target her MDD sx and does not endorse any side effects.    Currently, taking Gabapentin 100mg at bedtime for sleep and Ativan 0.25 at bedtime for sleep. Endorses good sleep hygiene and having previously undergone sleep CBT. States she was instructed that she can take Gabapentin 100mg up to TID or increase the dose to 200mg.    Denies SI/ AH/ VH.    Assessment & Plan:  Taylor Kerns is a 74 YO F w a Past Med Hx of BCC, MTHFR mutation, HLD, Past Psych Hx of TELMA, MDD presenting with insomnia 2/2 to Viibyrd 10mg daily. Given insomnia is a side effect of Viibyrd, I recommended she decrease the dose of Viibryd 10mg to Viibryd 5mg daily for 5 days. If insomnia does not resolve she can trial Gabapentin 200mg at bedtime. Instructed her to reach out to Psychiatrist Dr. Mcgrath, has upcoming appointment on 11/09/23 to follow up.  "

## 2023-11-07 NOTE — PROGRESS NOTES
Subjective   Patient ID: Taylor Kerns is a 73 y.o. female who presents November 8, 2023 for lower back and neck pain.    MCR    Today, the patient rates their degree of pain as a 6 out of 10 on the numeric pain rating scale.     HPI : Taylor presents to my office with main complaint of lower back pain. She reports waking up with increased lower back pain today without radicular complaints. Has been doing Pilates regularly without major issue. States she will be returning to [place] tomorrow through June of next year. She will be driving to [facility] and then taking the train from there. Reports she brings her own pillow for support when traveling and when in [place]. Receiving acupuncture today before her travels. Denies new trauma/incident.      Objective   Physical Exam  Neurological:      General: No focal deficit present.      Mental Status: She is alert and oriented to person, place, and time.      Cranial Nerves: No dysarthria or facial asymmetry.      Sensory: Sensation is intact.      Motor: Motor function is intact.      Coordination: Coordination is intact.      Gait: Gait is intact. Gait normal.       Palpation of the following region(s) revealed:  Cervical: Upper trapezius bilateral, muscular hypertonicity.  Cervical paraspinals bilateral, muscular hypertonicity.  Thoracic: Thoracic paraspinals bilateral, muscular hypertonicity.  Middle trapezius bilateral, muscular hypertonicity.  Pectoralis bilateral, muscular hypertonicity.  Lumbar: Lumbar paraspinals right, hypertonicity and tenderness.  Quadratus lumborum bilateral, hypertonicity and tenderness.  Psoas bilateral, muscular hypertonicity.        Segmental Joint(s): Segmental joint dysfunction was assessed with motion palpation and is identified in the following areas:  Cervical : C4 C5  Thoracic : T3, T4, and T7  Lumbopelvic / Sacral SIJ : L4, L5/S1, and R SIJ      Assessment/Plan   Today's Treatment Included: Chiropractic manipulation to the  Segmental Joint(s) Cervical : C4 C5 Segmental Joint(s) Lumbopelvic/Sacral SIJ : L4, L5/S1, and R SIJ Segmental Joint(s) Thoracic : T3, T4, and T7   Treatment Techniques Used : Direct Non-force technique, Activator/Tool assisted technique, and Pelvic drop table technique    Soft-tissue mobilization was performed in the following areas:   Cervical paraspinal mm. bilateral and Upper Trapezius bilateral  Thoracic Paraspinal mm. bilateral, Rhomboids bilateral, and Pectoralis bilateral  Lumbar Paraspinal mm. bilateral, Quadratus Lumborum bilateral, and Psoas bilateral      F/U upon return from [place]    The patient tolerated today's treatment with little or no additional discomfort and was instructed to contact the office for questions or concerns.

## 2023-11-08 ENCOUNTER — ALLIED HEALTH (OUTPATIENT)
Dept: INTEGRATIVE MEDICINE | Facility: CLINIC | Age: 73
End: 2023-11-08
Payer: MEDICARE

## 2023-11-08 DIAGNOSIS — M99.01 SEGMENTAL DYSFUNCTION OF CERVICAL REGION: ICD-10-CM

## 2023-11-08 DIAGNOSIS — M99.03 SEGMENTAL AND SOMATIC DYSFUNCTION OF LUMBAR REGION: Primary | ICD-10-CM

## 2023-11-08 DIAGNOSIS — M99.02 SEGMENTAL AND SOMATIC DYSFUNCTION OF THORACIC REGION: ICD-10-CM

## 2023-11-08 DIAGNOSIS — M54.50 CHRONIC BILATERAL LOW BACK PAIN WITHOUT SCIATICA: ICD-10-CM

## 2023-11-08 DIAGNOSIS — M54.2 CERVICALGIA: ICD-10-CM

## 2023-11-08 DIAGNOSIS — G89.29 CHRONIC BILATERAL LOW BACK PAIN WITHOUT SCIATICA: ICD-10-CM

## 2023-11-08 DIAGNOSIS — M79.18 MYALGIA, OTHER SITE: ICD-10-CM

## 2023-11-08 DIAGNOSIS — M54.50 CHRONIC BILATERAL LOW BACK PAIN WITHOUT SCIATICA: Primary | ICD-10-CM

## 2023-11-08 DIAGNOSIS — G89.29 CHRONIC BILATERAL LOW BACK PAIN WITHOUT SCIATICA: Primary | ICD-10-CM

## 2023-11-08 PROCEDURE — 97811 ACUP 1/> W/O ESTIM EA ADD 15: CPT | Performed by: ACUPUNCTURIST

## 2023-11-08 PROCEDURE — 98941 CHIROPRACT MANJ 3-4 REGIONS: CPT | Performed by: CHIROPRACTOR

## 2023-11-08 PROCEDURE — 97810 ACUP 1/> WO ESTIM 1ST 15 MIN: CPT | Performed by: ACUPUNCTURIST

## 2023-11-08 NOTE — PROGRESS NOTES
Acupuncture Visit:     Subjective   Patient ID: Taylor Kerns is a 73 y.o. female who presents for No chief complaint on file.  Supervising Medicare Provider: Taylor Stapleton  Diagnosis: CHRONIC LOW BACK PAIN   Initial visit 8/21/23 : 90 days : 11/21/23  5 / 12 visits in 90 days + add 8 days if improve      States she has been in more pain due to not coming in for regular acupuncture and chiropractic,  Also in crease pain as she has not been sleeping well, weaning off antidepressant.  States she will be going to [place] in 2 weeks for the winter.        Session Information  This is visit number: 5  The patient has a total number of visits of: 12  Initial Acupuncture Treatment date: 08/21/23  Name of Supervising Physician: DEB  Visit Type: Follow-up visit         Review of Systems         Provider reviewed plan for the acupuncture session, precautions and contraindications. Patient/guardian/hospital staff has given consent to treat with full understanding of what to expect during the session. Before acupuncture began, provider explained to the patient to communicate at any time if the procedure was causing discomfort past their tolerance level. Patient agreed to advise acupuncturist. The acupuncturist counseled the patient on the risks of acupuncture treatment including pain, infection, bleeding, and no relief of pain. The patient was positioned comfortably. There was no evidence of infection at the site of needle insertions.    Objective   Physical Exam              Acupuncture Treatment  Acupuncture Needling: Yes  Body Points - Left: Hrt 6-7  Body Points - Bilateral: anmian, Lv 3, Kd 3, 7, GB 34,  UB 13, 14, 23-26, 43  Body Points - Right: P 6  Other Techniques Utilized: TDP Lamp  TDP Lamp Descripton: low back pain with PSO  Needle Count In: 24  Needle Count Out: 24  Total Face to Face Time (min): 35 minutes              Assessment/Plan

## 2023-11-09 ENCOUNTER — TELEMEDICINE (OUTPATIENT)
Dept: BEHAVIORAL HEALTH | Facility: CLINIC | Age: 73
End: 2023-11-09
Payer: MEDICARE

## 2023-11-09 DIAGNOSIS — F33.41 RECURRENT MAJOR DEPRESSIVE DISORDER, IN PARTIAL REMISSION (CMS-HCC): ICD-10-CM

## 2023-11-09 DIAGNOSIS — F41.9 ANXIETY: ICD-10-CM

## 2023-11-09 PROCEDURE — 99215 OFFICE O/P EST HI 40 MIN: CPT | Performed by: PSYCHIATRY & NEUROLOGY

## 2023-11-09 ASSESSMENT — ENCOUNTER SYMPTOMS
NERVOUS/ANXIOUS: 1
DYSPHORIC MOOD: 1

## 2023-11-09 NOTE — PROGRESS NOTES
"Adult Ambulatory Psychiatry Progress Note      Assessment/Plan     Impression:  Taylor Kerns is a 73 y.o. female domiciled alone in [place] but staying with brother in [place], retired who presents for follow up with CC of Depression, Insomnia, and Anxiety.       Plan:   Depression - increase to abilify 3mg daily, stop vilazodone 10mg  Anxiety/insomnia - gabapentin 100-300mg qhs, ativan 0.25mg to PRN to reduce sedation           Subjective   HPI:  Pt arrived on time. She tried the vybriid but felt unwell and had to stop. It caused some insomnia as well. She's talking to some centers about TMS and spravato. Pt inquired about Trintellix. Discussed that it's an SSRI and pt feels she's not tolerated those well. Discussed trial of abilify 3mg. Sleep is improving without the vybriid.           Review of Systems   Psychiatric/Behavioral:  Positive for dysphoric mood. Negative for suicidal ideas. The patient is nervous/anxious.        OARRS:  No data recorded  I have personally reviewed the OARRS report for Taylor Kerns. I have considered the risks of abuse, dependence, addiction and diversion    Is the patient prescribed a combination of a benzodiazepine and opioid?  No    Objective   Mental Status Exam:  General Appearance: Well groomed, appropriate eye contact  Attitude/Behavior: Cooperative  Motor: No psychomotor agitation or retardation, no tremor or other abnormal movements  Speech: Normal rate, volume, prosody  Mood: \"ok\"  Affect: Euthymic, full-range  Thought Process: Linear, goal directed  Thought Associations: No loosening of associations  Thought Content: Normal  Perception: No perceptual abnormalities noted  Insight: Intact  Judgement: Intact    Vitals:  There were no vitals filed for this visit.    Current Medications:  Current Outpatient Medications on File Prior to Visit   Medication Sig Dispense Refill    ARIPiprazole (Abilify) 2 mg tablet Take 1 tablet (2 mg) by mouth once daily. 30 tablet 0    " gabapentin (Neurontin) 100 mg capsule Take 1 capsule (100 mg) by mouth as needed at bedtime (insomnia). 30 capsule 0    hydroquinone 4 % cream Apply as directed  Basilio Hernandez MD      LORazepam (Ativan) 0.5 mg tablet Take 0.5 tablets (0.25 mg) by mouth as needed at bedtime for sleep. 15 tablet 0    lubiprostone (Amitiza) 24 mcg capsule Take 1 capsule (24 mcg) by mouth.      MULTIVITAMIN ORAL Take by mouth.      tretinoin (Retin-A) 0.1 % cream Apply topically once daily at bedtime.      [DISCONTINUED] vilazodone (Viibryd) 10 mg tablet Take 1 tablet (10 mg) by mouth once daily. 30 tablet 0     No current facility-administered medications on file prior to visit.       Time Spent:    Prep time: 3  Direct patient time: 32  Documentation time: 5  Total time: 40 min    Next Appointment:  Follow up in 6 weeks (on 12/20/2023).

## 2023-12-20 ENCOUNTER — TELEMEDICINE (OUTPATIENT)
Dept: BEHAVIORAL HEALTH | Facility: CLINIC | Age: 73
End: 2023-12-20
Payer: MEDICARE

## 2023-12-20 DIAGNOSIS — F41.8 DEPRESSION WITH ANXIETY: ICD-10-CM

## 2023-12-20 DIAGNOSIS — F51.04 CHRONIC INSOMNIA: ICD-10-CM

## 2023-12-20 DIAGNOSIS — F33.41 RECURRENT MAJOR DEPRESSIVE DISORDER, IN PARTIAL REMISSION (CMS-HCC): ICD-10-CM

## 2023-12-20 PROCEDURE — 99215 OFFICE O/P EST HI 40 MIN: CPT | Performed by: PSYCHIATRY & NEUROLOGY

## 2023-12-20 RX ORDER — ARIPIPRAZOLE 2 MG/1
3 TABLET ORAL DAILY
Qty: 135 TABLET | Refills: 0 | Status: SHIPPED | OUTPATIENT
Start: 2023-12-20 | End: 2024-03-19

## 2023-12-20 RX ORDER — GABAPENTIN 100 MG/1
100 CAPSULE ORAL NIGHTLY PRN
Qty: 90 CAPSULE | Refills: 0 | Status: SHIPPED | OUTPATIENT
Start: 2023-12-20 | End: 2024-03-19

## 2023-12-20 RX ORDER — LEVOMEFOLATE/ALGAL OIL 15-90.314
1 CAPSULE ORAL DAILY
Qty: 90 CAPSULE | Refills: 0 | Status: SHIPPED | OUTPATIENT
Start: 2023-12-20 | End: 2024-03-19

## 2023-12-20 ASSESSMENT — ENCOUNTER SYMPTOMS: NERVOUS/ANXIOUS: 1

## 2023-12-20 NOTE — PROGRESS NOTES
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 Pt to follow up with [facility] for med mgt     Plan:   Depression - increase to abilify 3mg daily, start deplin  Anxiety/insomnia - gabapentin 100-300mg qhs, ativan 0.25mg      Subjective   HPI:  Pt arrived on time. She forgot to increase the abilify. She did start the TMS and is shelter through. Yesterday she felt \"a little lighter\" for the first time. Today she's feeling more down. Anxiety goes up and down. She continues to struggle with motivation. Discussed how TMS could help with anxiety. Could pursue acupuncture for anxiety.           Review of Systems   Psychiatric/Behavioral:  Negative for suicidal ideas. The patient is nervous/anxious.            Objective   Mental Status Exam:  General Appearance: Well groomed, appropriate eye contact  Attitude/Behavior: Cooperative  Motor: No psychomotor agitation or retardation, no tremor or other abnormal movements  Speech: Normal rate, volume, prosody  Mood: \"ok\"  Affect: Constricted  Thought Process: Linear, goal directed  Thought Associations: No loosening of associations  Thought Content: Normal  Perception: No perceptual abnormalities noted  Insight: Intact  Judgement: Intact    Vitals:  There were no vitals filed for this visit.    Current Medications:  Current Outpatient Medications on File Prior to Visit   Medication Sig Dispense Refill    hydroquinone 4 % cream Apply as directed  Basilio Hernandez MD      LORazepam (Ativan) 0.5 mg tablet Take 0.5 tablets (0.25 mg) by mouth as needed at bedtime for sleep. 15 tablet 0    lubiprostone (Amitiza) 24 mcg capsule Take 1 capsule (24 mcg) by mouth.      MULTIVITAMIN ORAL Take by mouth.      tretinoin (Retin-A) 0.1 % cream Apply topically once daily at bedtime.      [DISCONTINUED] ARIPiprazole " (Abilify) 2 mg tablet Take 1 tablet (2 mg) by mouth once daily. 30 tablet 0    [DISCONTINUED] gabapentin (Neurontin) 100 mg capsule Take 1 capsule (100 mg) by mouth as needed at bedtime (insomnia). 30 capsule 0     No current facility-administered medications on file prior to visit.         Orders:  Diagnoses and all orders for this visit:  Depression with anxiety  Recurrent major depressive disorder, in partial remission (CMS/HCC)  -     levomefolate-algal oil ([place], algal oil,) 15-90.314 mg capsule; Take 1 capsule by mouth once daily.  -     ARIPiprazole (Abilify) 2 mg tablet; Take 1.5 tablets (3 mg) by mouth once daily.  Chronic insomnia  -     gabapentin (Neurontin) 100 mg capsule; Take 1 capsule (100 mg) by mouth as needed at bedtime (insomnia).          Time Spent:    Prep time: 3  Direct patient time: 32  Documentation time: 5  Total time: 40 min    Next Appointment:  No follow-ups on file.

## 2024-05-31 ENCOUNTER — TELEPHONE (OUTPATIENT)
Dept: OTHER | Age: 74
End: 2024-05-31
Payer: MEDICARE

## 2024-05-31 ENCOUNTER — TELEPHONE (OUTPATIENT)
Dept: BEHAVIORAL HEALTH | Facility: CLINIC | Age: 74
End: 2024-05-31
Payer: MEDICARE

## 2024-05-31 ENCOUNTER — PATIENT MESSAGE (OUTPATIENT)
Dept: BEHAVIORAL HEALTH | Facility: CLINIC | Age: 74
End: 2024-05-31
Payer: MEDICARE

## 2024-05-31 NOTE — PATIENT COMMUNICATION
Called back. She's feeling restless and jittery. She's struggling to calm down. She is pacing. She's still in [place] and planning to come back next week. She tried to find a provider to see in [place]. Inquired about medicine changes. She tried to stop abilify for a few days before this started. She's resume the abilify but it hasn't helped. She has ativan and gabapentin and has been taking it at night for sleep. Suggested trying gabapentin during the day first and if it doesn't help then take 1/2 tablet ativan.

## 2024-05-31 NOTE — TELEPHONE ENCOUNTER
Pt's brother is very concerned about pt who is still in [place] and can't obtain the help she needs & also refuses to go to an ER. Caller would like to speak to you ASA,

## 2024-06-11 ENCOUNTER — TELEMEDICINE (OUTPATIENT)
Dept: BEHAVIORAL HEALTH | Facility: CLINIC | Age: 74
End: 2024-06-11
Payer: MEDICARE

## 2024-06-11 DIAGNOSIS — F41.9 ANXIETY: ICD-10-CM

## 2024-06-11 DIAGNOSIS — F33.41 RECURRENT MAJOR DEPRESSIVE DISORDER, IN PARTIAL REMISSION (CMS-HCC): ICD-10-CM

## 2024-06-11 DIAGNOSIS — Z79.899 MEDICATION MANAGEMENT: ICD-10-CM

## 2024-06-11 DIAGNOSIS — F51.04 CHRONIC INSOMNIA: ICD-10-CM

## 2024-06-11 PROCEDURE — 99214 OFFICE O/P EST MOD 30 MIN: CPT | Performed by: PSYCHIATRY & NEUROLOGY

## 2024-06-11 RX ORDER — GABAPENTIN 100 MG/1
100 CAPSULE ORAL NIGHTLY PRN
Qty: 90 CAPSULE | Refills: 0 | Status: SHIPPED | OUTPATIENT
Start: 2024-06-11 | End: 2024-09-09

## 2024-06-11 RX ORDER — LORAZEPAM 1 MG/1
0.5 TABLET ORAL 2 TIMES DAILY PRN
Qty: 30 TABLET | Refills: 2 | Status: SHIPPED | OUTPATIENT
Start: 2024-06-11 | End: 2024-09-09

## 2024-06-11 RX ORDER — ARIPIPRAZOLE 2 MG/1
2 TABLET ORAL DAILY
Qty: 90 TABLET | Refills: 1 | Status: SHIPPED | OUTPATIENT
Start: 2024-06-11 | End: 2024-12-08

## 2024-06-11 RX ORDER — BUSPIRONE HYDROCHLORIDE 5 MG/1
2.5 TABLET ORAL EVERY MORNING
Qty: 15 TABLET | Refills: 0 | Status: SHIPPED | OUTPATIENT
Start: 2024-06-11 | End: 2024-07-11

## 2024-06-11 ASSESSMENT — ENCOUNTER SYMPTOMS: NERVOUS/ANXIOUS: 1

## 2024-06-11 NOTE — PROGRESS NOTES
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     Plan:   Depression - abilify 2mg daily  Anxiety/insomnia - start buspar 2.5mg in morning, gabapentin 100-200mg qhs, ativan 0.25mg QID prn      Subjective   HPI:  Pt arrived on time. In late May she tried going off the abilify. After that anxiety became unmanageable. She was very jittery and restless and it was happening all day. She struggled to function and required writer to call her with strategy to help her get back to [place] for the summer. She hadn't been taking the gabapentin. Agreed to try that which was helpful and she was able to get to [place] with great difficulty. She's feeling anxious on and off through the day. Her sleep is interrupted now. It was better before that. She's trying different things to help. She's taking 0.25mg ativan in the morning, afternoon and gabapentin and 0.5mg ativan at bedtime. She's been doing that for a few days. Last night she woke at 5:30 so it doesn't seem to be working. She did TMS in [place]. At the end she felt improved for a month. Some things happened in her life that made her anxious. It was 36 sessions so she doesn't want to do it again. She talked to a clinic about the [place] but didn't feel it was a long term solution because she would have to keep going back. She tried the viibryd but it made her jittery. Discussed trial of buspar. Reviewed SE.       OARRS:  Chuck Cantu MD on 6/11/2024 10:00 AM  I have personally reviewed the OARRS report for Taylor Kerns. I have considered the risks of abuse, dependence, addiction and diversion    Is the patient prescribed a combination of a benzodiazepine and opioid?  No    05/26/2024 02/01/2024 4   Lorazepam 0.5 Mg Tablet  30.00 30 Er Mal [id number] Pub (8670) 2 0.50  [place]ME [place]  04/26/2024 02/01/2024 4   Lorazepam 0.5 Mg Tablet  30.00 30 Er Mal [id number] Pub (1736) 1 0.50 [place]  02/04/2024 02/01/2024 4   Lorazepam 0.5 Mg Tablet  30.00 30 Er Mal [id number] Pub (9409) 0 0.50 [place]    Last Urine Drug Screen / ordered today: Yes  No results found for this or any previous visit (from the past 8760 hour(s)).  N/A      Controlled Substance Agreement:  Date of the Last Agreement: Not done today due to time constraints; will attempt to walk through e-signing next month  Reviewed Controlled Substance Agreement including but not limited to the benefits, risks, and alternatives to treatment with a Controlled Substance medication(s).      Review of Systems   Psychiatric/Behavioral:  Negative for suicidal ideas. The patient is nervous/anxious.            Objective   Mental Status Exam:  General Appearance: Well groomed, appropriate eye contact  Attitude/Behavior: Cooperative  Motor: No psychomotor agitation or retardation, no tremor or other abnormal movements  Speech: Normal rate, volume, prosody  Mood: anxious  Affect: Dysphoric, constricted but reactive  Thought Process: Linear, goal directed  Thought Associations: No loosening of associations  Thought Content: Normal  Perception: No perceptual abnormalities noted  Insight: Intact  Judgement: Intact    Vitals:  There were no vitals filed for this visit.    Current Medications:  Current Outpatient Medications on File Prior to Visit   Medication Sig Dispense Refill    hydroquinone 4 % cream Apply as directed  Basilio Hernandez MD      lubiprostone (Amitiza) 24 mcg capsule Take 1 capsule (24 mcg) by mouth.      MULTIVITAMIN ORAL Take by mouth.      tretinoin (Retin-A) 0.1 % cream Apply topically once daily at bedtime.      [DISCONTINUED] ARIPiprazole (Abilify) 2 mg tablet Take 1.5 tablets (3 mg) by mouth once daily. 135 tablet 0    [DISCONTINUED] gabapentin (Neurontin) 100 mg capsule Take 1 capsule (100 mg) by mouth as needed at  bedtime (insomnia). 90 capsule 0    [DISCONTINUED] LORazepam (Ativan) 0.5 mg tablet Take 0.5 tablets (0.25 mg) by mouth as needed at bedtime for sleep. 15 tablet 0     No current facility-administered medications on file prior to visit.         Orders:  Diagnoses and all orders for this visit:  Anxiety  -     busPIRone (Buspar) 5 mg tablet; Take 0.5 tablets (2.5 mg) by mouth once daily in the morning.  -     LORazepam (Ativan) 1 mg tablet; Take 0.5 tablets (0.5 mg) by mouth 2 times a day as needed for anxiety.  Medication management  -     Opiate/Opioid/Benzo Prescription Compliance; Future  Recurrent major depressive disorder, in partial remission (CMS-HCC)  -     ARIPiprazole (Abilify) 2 mg tablet; Take 1 tablet (2 mg) by mouth once daily.  Chronic insomnia  -     gabapentin (Neurontin) 100 mg capsule; Take 1 capsule (100 mg) by mouth as needed at bedtime (insomnia).            Time Spent:    Prep time: 2  Direct patient time: 28  Documentation time: 5  Total time: 35 min    Next Appointment:  Follow up in 22 days (on 7/3/2024).

## 2024-06-14 ENCOUNTER — LAB (OUTPATIENT)
Dept: LAB | Facility: LAB | Age: 74
End: 2024-06-14
Payer: MEDICARE

## 2024-06-14 DIAGNOSIS — Z79.899 MEDICATION MANAGEMENT: ICD-10-CM

## 2024-06-14 LAB
AMPHETAMINES UR QL SCN: NORMAL
BARBITURATES UR QL SCN: NORMAL
BZE UR QL SCN: NORMAL
CANNABINOIDS UR QL SCN: NORMAL
CREAT UR-MCNC: 102.7 MG/DL (ref 20–320)
PCP UR QL SCN: NORMAL

## 2024-06-14 PROCEDURE — 80354 DRUG SCREENING FENTANYL: CPT

## 2024-06-14 PROCEDURE — 82570 ASSAY OF URINE CREATININE: CPT

## 2024-06-14 PROCEDURE — 80361 OPIATES 1 OR MORE: CPT

## 2024-06-14 PROCEDURE — 80365 DRUG SCREENING OXYCODONE: CPT

## 2024-06-14 PROCEDURE — 80373 DRUG SCREENING TRAMADOL: CPT

## 2024-06-14 PROCEDURE — 80368 SEDATIVE HYPNOTICS: CPT

## 2024-06-14 PROCEDURE — 80307 DRUG TEST PRSMV CHEM ANLYZR: CPT

## 2024-06-14 PROCEDURE — 80346 BENZODIAZEPINES1-12: CPT

## 2024-06-14 PROCEDURE — 80358 DRUG SCREENING METHADONE: CPT

## 2024-06-20 ENCOUNTER — ALLIED HEALTH (OUTPATIENT)
Dept: INTEGRATIVE MEDICINE | Facility: CLINIC | Age: 74
End: 2024-06-20
Payer: MEDICARE

## 2024-06-20 ENCOUNTER — APPOINTMENT (OUTPATIENT)
Dept: BEHAVIORAL HEALTH | Facility: CLINIC | Age: 74
End: 2024-06-20
Payer: MEDICARE

## 2024-06-20 DIAGNOSIS — M79.18 MYALGIA, OTHER SITE: ICD-10-CM

## 2024-06-20 DIAGNOSIS — M54.2 CERVICALGIA: ICD-10-CM

## 2024-06-20 DIAGNOSIS — G89.29 CHRONIC BILATERAL LOW BACK PAIN WITHOUT SCIATICA: ICD-10-CM

## 2024-06-20 DIAGNOSIS — M99.01 SEGMENTAL DYSFUNCTION OF CERVICAL REGION: ICD-10-CM

## 2024-06-20 DIAGNOSIS — M54.50 CHRONIC BILATERAL LOW BACK PAIN WITHOUT SCIATICA: ICD-10-CM

## 2024-06-20 DIAGNOSIS — M99.03 SEGMENTAL AND SOMATIC DYSFUNCTION OF LUMBAR REGION: Primary | ICD-10-CM

## 2024-06-20 DIAGNOSIS — M99.02 SEGMENTAL AND SOMATIC DYSFUNCTION OF THORACIC REGION: ICD-10-CM

## 2024-06-20 LAB
1OH-MIDAZOLAM UR CFM-MCNC: <25 NG/ML
6MAM UR CFM-MCNC: <25 NG/ML
7AMINOCLONAZEPAM UR CFM-MCNC: <25 NG/ML
A-OH ALPRAZ UR CFM-MCNC: <25 NG/ML
ALPRAZ UR CFM-MCNC: <25 NG/ML
CHLORDIAZEP UR CFM-MCNC: <25 NG/ML
CLONAZEPAM UR CFM-MCNC: <25 NG/ML
CODEINE UR CFM-MCNC: <50 NG/ML
DIAZEPAM UR CFM-MCNC: <25 NG/ML
EDDP UR CFM-MCNC: <25 NG/ML
FENTANYL UR CFM-MCNC: <2.5 NG/ML
HYDROCODONE CTO UR CFM-MCNC: <25 NG/ML
HYDROMORPHONE UR CFM-MCNC: <25 NG/ML
LORAZEPAM UR CFM-MCNC: 693 NG/ML
METHADONE UR CFM-MCNC: <25 NG/ML
MIDAZOLAM UR CFM-MCNC: <25 NG/ML
MORPHINE UR CFM-MCNC: <50 NG/ML
NORDIAZEPAM UR CFM-MCNC: <25 NG/ML
NORFENTANYL UR CFM-MCNC: <2.5 NG/ML
NORHYDROCODONE UR CFM-MCNC: <25 NG/ML
NOROXYCODONE UR CFM-MCNC: <25 NG/ML
NORTRAMADOL UR-MCNC: <50 NG/ML
OXAZEPAM UR CFM-MCNC: <25 NG/ML
OXYCODONE UR CFM-MCNC: <25 NG/ML
OXYMORPHONE UR CFM-MCNC: <25 NG/ML
TEMAZEPAM UR CFM-MCNC: <25 NG/ML
TRAMADOL UR CFM-MCNC: <50 NG/ML
ZOLPIDEM UR CFM-MCNC: <25 NG/ML
ZOLPIDEM UR-MCNC: <25 NG/ML

## 2024-06-20 PROCEDURE — 98941 CHIROPRACT MANJ 3-4 REGIONS: CPT | Performed by: CHIROPRACTOR

## 2024-06-20 NOTE — PROGRESS NOTES
Subjective   Patient ID: Taylor Kerns is a 73 y.o. female who presents June 20, 2024 for lower back and neck pain.    MCR    Today, the patient rates their degree of pain as a 6 out of 10 on the numeric pain rating scale.     HPI : Taylor presents to my office with main complaint of lower back pain. Main complaint today is bilateral lower back pain, left hip discomfort and neck tension. She recently returned from spending the winter and spring in [place] and received chiropractic care while there. Notes that she experienced an episode of significant anxiety during the tail-end of her time in [place] and was able to return home with the help of her son. Anxiety symptoms have improved but she is experiencing a side effect of fatigue from her current medication. Denies new trauma/incident.      Objective   Physical Exam  Neurological:      General: No focal deficit present.      Mental Status: She is alert and oriented to person, place, and time.      Cranial Nerves: No dysarthria or facial asymmetry.      Sensory: Sensation is intact.      Motor: Motor function is intact.      Coordination: Coordination is intact.      Gait: Gait is intact. Gait normal.       Palpation of the following region(s) revealed:  Cervical: Upper trapezius bilateral, muscular hypertonicity.  Cervical paraspinals bilateral, muscular hypertonicity.  Thoracic: Thoracic paraspinals bilateral, muscular hypertonicity.  Middle trapezius bilateral, muscular hypertonicity.  Pectoralis bilateral, muscular hypertonicity.  Lumbar: Lumbar paraspinals right, hypertonicity and tenderness.  Quadratus lumborum bilateral, hypertonicity and tenderness.  Gluteal right, hypertonicity and tenderness.  Psoas left, hypertonicity and tenderness.        Segmental Joint(s): Segmental joint dysfunction was assessed with motion palpation and is identified in the following areas:  Cervical : C4 C5  Thoracic : T3, T4, and T7  Lumbopelvic / Sacral SIJ : L4, L5/S1, and  R SIJ      Assessment/Plan   Today's Treatment Included: Chiropractic manipulation to the Segmental Joint(s) Cervical : C4 C5 Segmental Joint(s) Lumbopelvic/Sacral SIJ : L4, L5/S1, and R SIJ Segmental Joint(s) Thoracic : T3, T4, and T7   Treatment Techniques Used : Direct Non-force technique, Activator/Tool assisted technique, and Pelvic drop table technique    Soft-tissue mobilization was performed in the following areas:   Cervical paraspinal mm. bilateral and Upper Trapezius bilateral  Thoracic Paraspinal mm. bilateral, Rhomboids bilateral, and Pectoralis bilateral  Lumbar Paraspinal mm. bilateral, Quadratus Lumborum bilateral, and Psoas bilateral    Supine ART release to BL psoas (L>R)  IC to R glute med    F/U in 1-2 weeks    The patient tolerated today's treatment with little or no additional discomfort and was instructed to contact the office for questions or concerns.

## 2024-06-21 ENCOUNTER — TELEMEDICINE (OUTPATIENT)
Dept: BEHAVIORAL HEALTH | Facility: CLINIC | Age: 74
End: 2024-06-21
Payer: MEDICARE

## 2024-06-21 DIAGNOSIS — F51.04 CHRONIC INSOMNIA: ICD-10-CM

## 2024-06-21 DIAGNOSIS — F41.9 ANXIETY: Primary | ICD-10-CM

## 2024-06-21 PROCEDURE — 1036F TOBACCO NON-USER: CPT | Performed by: PSYCHIATRY & NEUROLOGY

## 2024-06-21 PROCEDURE — 1159F MED LIST DOCD IN RCRD: CPT | Performed by: PSYCHIATRY & NEUROLOGY

## 2024-06-21 PROCEDURE — 1160F RVW MEDS BY RX/DR IN RCRD: CPT | Performed by: PSYCHIATRY & NEUROLOGY

## 2024-06-21 PROCEDURE — 99213 OFFICE O/P EST LOW 20 MIN: CPT | Performed by: PSYCHIATRY & NEUROLOGY

## 2024-06-21 RX ORDER — BUSPIRONE HYDROCHLORIDE 5 MG/1
2.5 TABLET ORAL EVERY MORNING
Qty: 45 TABLET | Refills: 0 | Status: SHIPPED | OUTPATIENT
Start: 2024-06-21 | End: 2024-09-19

## 2024-06-21 ASSESSMENT — ENCOUNTER SYMPTOMS: NERVOUS/ANXIOUS: 1

## 2024-06-21 NOTE — PROGRESS NOTES
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     Plan:   Depression - abilify 2mg daily  Anxiety/insomnia - buspar 2.5mg in morning, gabapentin 100-200mg qhs, ativan 0.25mg QID prn      Subjective   HPI:  Pt arrived on time. Mood \"better\" since last session. She thinks the buspar is \"kicking in\". She had SE like dizzy but it's starting to subside. She's back to just taking ativan at night. She tried a therapist but doesn't feel it's going well. She's interested in going off the abilify. Agreed to revisit after she comes back from [facility].       OARRS:  Chuck Cantu MD on 6/21/2024 12:04 PM  I have personally reviewed the OARRS report for Taylor Kerns. I have considered the risks of abuse, dependence, addiction and diversion    Is the patient prescribed a combination of a benzodiazepine and opioid?  No      Last Urine Drug Screen / ordered today: No  Recent Results (from the past 8760 hour(s))   Confirmation Opiate/Opioid/Benzo Prescription Compliance    Collection Time: 06/14/24 10:07 AM   Result Value Ref Range    Clonazepam <25 <25 ng/mL    7-Aminoclonazepam <25 <25 ng/mL    Alprazolam <25 <25 ng/mL    Alpha-Hydroxyalprazolam <25 <25 ng/mL    Midazolam <25 <25 ng/mL    Alpha-Hydroxymidazolam <25 <25 ng/mL    Chlordiazepoxide <25 <25 ng/mL    Diazepam <25 <25 ng/mL    Nordiazepam <25 <25 ng/mL    Temazepam <25 <25 ng/mL    Oxazepam <25 <25 ng/mL    Lorazepam 693 (H) <25 ng/mL    Methadone <25 <25 ng/mL    EDDP <25 <25 ng/mL    6-Acetylmorphine <25 <25 ng/mL    Codeine <50 <50 ng/mL    Hydrocodone <25 <25 ng/mL    Hydromorphone <25 <25 ng/mL    Morphine  <50 <50 ng/mL    Norhydrocodone <25 <25 ng/mL    Noroxycodone <25 <25 ng/mL    Oxycodone <25 <25 ng/mL    Oxymorphone <25 <25 ng/mL    Fentanyl <2.5 <2.5 ng/mL    " "Norfentanyl <2.5 <2.5 ng/mL    Tramadol <50 <50 ng/mL    O-Desmethyltramadol <50 <50 ng/mL    Zolpidem <25 <25 ng/mL    Zolpidem Metabolite (ZCA) <25 <25 ng/mL   Screen Opiate/Opioid/Benzo Prescription Compliance    Collection Time: 06/14/24 10:07 AM   Result Value Ref Range    Creatinine, Urine Random 102.7 20.0 - 320.0 mg/dL    Amphetamine Screen, Urine Presumptive Negative Presumptive Negative    Barbiturate Screen, Urine Presumptive Negative Presumptive Negative    Cannabinoid Screen, Urine Presumptive Negative Presumptive Negative    Cocaine Metabolite Screen, Urine Presumptive Negative Presumptive Negative    PCP Screen, Urine Presumptive Negative Presumptive Negative     Results are as expected.       Controlled Substance Agreement:  Date of the Last Agreement: Not done today due to time constraints; will attempt to walk through e-signing next month  Reviewed Controlled Substance Agreement including but not limited to the benefits, risks, and alternatives to treatment with a Controlled Substance medication(s).      Review of Systems   Psychiatric/Behavioral:  Negative for suicidal ideas. The patient is nervous/anxious.            Objective   Mental Status Exam:  General Appearance: Well groomed, appropriate eye contact  Attitude/Behavior: Cooperative  Motor: No psychomotor agitation or retardation, no tremor or other abnormal movements  Speech: Normal rate, volume, prosody  Mood: \"better\"  Affect: Euthymic, full-range  Thought Process: Linear, goal directed  Thought Associations: No loosening of associations  Thought Content: Normal  Perception: No perceptual abnormalities noted  Insight: Intact  Judgement: Intact    Vitals:  There were no vitals filed for this visit.    Current Medications:  Current Outpatient Medications on File Prior to Visit   Medication Sig Dispense Refill    ARIPiprazole (Abilify) 2 mg tablet Take 1 tablet (2 mg) by mouth once daily. 90 tablet 1    gabapentin (Neurontin) 100 mg capsule " Take 1 capsule (100 mg) by mouth as needed at bedtime (insomnia). 90 capsule 0    hydroquinone 4 % cream Apply as directed  Basilio Hernandez MD      LORazepam (Ativan) 1 mg tablet Take 0.5 tablets (0.5 mg) by mouth 2 times a day as needed for anxiety. 30 tablet 2    lubiprostone (Amitiza) 24 mcg capsule Take 1 capsule (24 mcg) by mouth.      MULTIVITAMIN ORAL Take by mouth.      tretinoin (Retin-A) 0.1 % cream Apply topically once daily at bedtime.      [DISCONTINUED] busPIRone (Buspar) 5 mg tablet Take 0.5 tablets (2.5 mg) by mouth once daily in the morning. 15 tablet 0     No current facility-administered medications on file prior to visit.         Orders:  Diagnoses and all orders for this visit:  Anxiety  -     busPIRone (Buspar) 5 mg tablet; Take 0.5 tablets (2.5 mg) by mouth once daily in the morning.  Chronic insomnia              Next Appointment:  No follow-ups on file.

## 2024-06-27 NOTE — PROGRESS NOTES
Subjective     Taylor Kerns is a 73 y.o. female who presents for the following: Skin Check.  She notes a scaly bump on the left side of her nose, which she reports has been present for several months and does not heal.  She denies any other new, changing, or concerning skin lesions since her last visit; no bleeding, itching, or burning lesions.      Review of Systems:  No other skin or systemic complaints other than what is documented elsewhere in the note.    The following portions of the chart were reviewed this encounter and updated as appropriate:       Skin Cancer History  No skin cancer on file.    Specialty Problems          Dermatology Problems    Actinic keratosis    Basal cell carcinoma of skin of other part of trunk    Basal cell carcinoma of skin of right upper limb, including shoulder    Melanocytic nevi of scalp and neck    Neoplasm of uncertain behavior of skin    Other hypertrophic disorders of the skin    Other melanin hyperpigmentation    Other viral warts    Skin changes due to chronic exposure to nonionizing radiation, unspecified       Past Dermatologic / Past Relevant Medical History:    - history of BCC on right nasal ala diagnosed at initial visit on 11/2/20 s/p Mohs surgery by Dr. Adam on 11/20/20  - 2 BCCs on left upper back and left thigh both diagnosed by Dr. Dickinson on 6/26/23 and both s/p ED&C by Dr. 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 [employer]; her brother is retired from working as an internist here at , and she now lives most of the year in [facility]  [place]    Allergies:  Amoxicillin    Current Medications / CAM's:    Current Outpatient Medications:     ARIPiprazole (Abilify) 2 mg tablet, Take 1 tablet (2 mg) by mouth once daily., Disp: 90 tablet, Rfl: 1    busPIRone (Buspar) 5 mg tablet, Take 0.5 tablets (2.5 mg) by mouth once daily in the morning., Disp: 45 tablet, Rfl: 0    gabapentin (Neurontin) 100 mg capsule, Take 1 capsule (100 mg) by mouth as needed at bedtime (insomnia)., Disp: 90 capsule, Rfl: 0    hydroquinone 4 % cream, Apply as directed Basilio Hernandez MD, Disp: , Rfl:     LORazepam (Ativan) 1 mg tablet, Take 0.5 tablets (0.5 mg) by mouth 2 times a day as needed for anxiety., Disp: 30 tablet, Rfl: 2    lubiprostone (Amitiza) 24 mcg capsule, Take 1 capsule (24 mcg) by mouth., Disp: , Rfl:     MULTIVITAMIN ORAL, Take by mouth., Disp: , Rfl:     tretinoin (Retin-A) 0.1 % cream, Apply topically once daily at bedtime., Disp: , Rfl:      Objective   Well appearing patient in no apparent distress; mood and affect are within normal limits.    A full examination was performed including scalp, face, eyes, ears, nose, lips, neck, chest, axillae, abdomen, back, bilateral upper extremities, and bilateral lower extremities. All findings within normal limits unless otherwise noted below.    Assessment/Plan   1. Neoplasm of uncertain behavior of skin (2)  Left Nasal Dorsum  3 mm pink, shiny papule           Lesion biopsy  Type of biopsy: tangential    Informed consent: discussed and consent obtained    Timeout: patient name, date of birth, surgical site, and procedure verified    Procedure prep:  Patient was prepped and draped  Anesthesia: the lesion was anesthetized in a standard fashion    Anesthetic:  1% lidocaine w/ epinephrine 1-100,000 local infiltration  Instrument used: flexible razor blade    Hemostasis achieved with: aluminum chloride    Outcome: patient tolerated procedure well    Post-procedure details: wound care instructions given      Staff Communication:  Dermatology Local Anesthesia: 1 % Lidocaine / Epinephrine - Amount:0.5ml    Specimen 1 - Dermatopathology- DERM LAB  Differential Diagnosis: BCC v SK v HAK v SCCIS  Check Margins Yes/No?:    Comments:    Dermpath Lab: Routine Histopathology (formalin-fixed tissue)    Left Anterior Shoulder  1.2 cm pink, scaly patch          Lesion biopsy  Type of biopsy: tangential    Informed consent: discussed and consent obtained    Timeout: patient name, date of birth, surgical site, and procedure verified    Procedure prep:  Patient was prepped and draped  Anesthesia: the lesion was anesthetized in a standard fashion    Anesthetic:  1% lidocaine w/ epinephrine 1-100,000 local infiltration  Instrument used: flexible razor blade    Hemostasis achieved with: aluminum chloride    Outcome: patient tolerated procedure well    Post-procedure details: wound care instructions given      Staff Communication: Dermatology Local Anesthesia: 1 % Lidocaine / Epinephrine - Amount:0.5ml    Specimen 2 - Dermatopathology- DERM LAB  Differential Diagnosis: SK v BCC v AK  Check Margins Yes/No?:    Comments:    Dermpath Lab: Routine Histopathology (formalin-fixed tissue)    2. Actinic keratosis (5)  Chest - Medial (Center) (5)  Scattered on the patient's chest, there are 5 erythematous, gritty, scaly macules     Actinic Keratoses -scattered on chest.  The pre-cancerous nature of these lesions and treatment options were discussed with the patient today.  At this time, I recommend treatment with liquid nitrogen cryotherapy.  The patient expressed understanding, is in agreement with this plan, and wishes to proceed with cryotherapy today.    Destr of lesion - Chest - Medial (Center)  Complexity: simple    Destruction method: cryotherapy    Informed consent: discussed and consent obtained    Lesion destroyed using liquid nitrogen: Yes    Cryotherapy cycles:  1  Outcome: patient tolerated procedure well with no complications    Post-procedure details:  wound care instructions given      3. Melanocytic nevus of trunk  Scattered on the patient's face, neck, trunk, and extremities, there are several small, round- to oval-shaped, brown-pigmented and pink-colored, symmetric, uniform-appearing macules and dome-shaped papules    Clinically benign- to slightly atypical-appearing nevi - the clinically benign- to slightly atypical-appearing nature of the patient's nevi was discussed with the patient today.  None of the patient's nevi meet threshold for biopsy today.  I emphasized the importance of performing monthly self-skin exams using the ABCDs of monitoring moles, which were reviewed with the patient today and an informational hand-out provided.  I also emphasized the importance of sun avoidance and sun protection with daily sunscreen use.  The patient expressed understanding and is in agreement with this plan.    4. Seborrheic keratosis  Scattered on the patient's face, neck, trunk, and extremities, there are multiple tan- to light brown-colored, hyperkeratotic, stuck-on appearing papules of varying size and shape    Seborrheic Keratoses - the benign nature of these lesions was discussed with the patient today and reassurance provided.  No treatment is medically indicated for these lesions at this time.    5. Lentigo  Photodistributed  Multiple tan- to light brown-colored, round- to oval-shaped, symmetric and uniform-appearing macules and small patches consistent with lentigines scattered in sun-exposed areas.    Solar Lentigines and photodamage.  The clinically benign-appearing nature of these lesions and their relation to chronic sun exposure were discussed with the patient today and reassurance provided.  None of these lesions meet threshold for biopsy today, and thus no treatment is medically indicated for these lesions at this time.  The signs and symptoms of skin cancer were reviewed and the patient was advised to practice sun protection and sun avoidance, use  daily sunscreen, and perform regular self skin exams.  The patient was instructed to monitor these lesions for any changes, such as in size, shape, or color, or associated symptoms and to call our office to schedule a return visit for re-evaluation if any such changes or symptoms are noticed in the future.  The patient expressed understanding and is in agreement with this plan.    6. Seborrheic dermatitis  Head - Anterior (Face)  On the patient's face, mainly the glabella and bilateral eyebrows and perinasal creases, there are pink, scaly patches with whitish-yellowish, greasy scale    Seborrheic Dermatitis - face.  The potentially chronic and intermittently flaring nature of this condition and treatment options were discussed extensively with the patient today.  At this time, I recommend topical anti-fungal therapy with Ketoconazole 2% cream, which the patient was instructed to apply twice daily to the affected areas of the face.  The risks, benefits, and side effects of this medication were discussed.  The patient expressed understanding and is in agreement with this plan.    7. History of nonmelanoma skin cancer  On the patient's right nasal ala, left upper back, left thigh, mid chest, and left mid back, there are well-healed scars with no evidence of recurrent growth on exam today.    History of basal cell carcinomas, dysplastic nevus, and actinic keratoses and photodamage.  There is no evidence of recurrence on exam today.  The signs and symptoms of skin cancer were reviewed and the patient was advised to practice sun protection and sun avoidance, use daily sunscreen, and perform regular self skin exams.  I will have the patient return to our office in 6 to 12 months, pending the above biopsy results, for routine follow-up and skin exam, and the patient was instructed to call our office should the patient notice any new, changing, symptomatic, or otherwise concerning skin lesions before then.  The patient  expressed understanding and is in agreement with this plan.    8. Diffuse photodamage of skin  Photodistributed  Diffuse photodamage with actinic changes with telangiectasia and mottled pigmentation in sun-exposed areas.    Photodamage.  The signs and symptoms of skin cancer were reviewed and the patient was advised to practice sun protection and sun avoidance, use daily sunscreen, and perform regular self skin exams.  Sun protection was discussed, including avoiding the mid-day sun, wearing a sunscreen with SPF at least 50, and stressing the need for reapplication of sunscreen and applying more than they think they need.

## 2024-07-01 ENCOUNTER — ALLIED HEALTH (OUTPATIENT)
Dept: INTEGRATIVE MEDICINE | Facility: CLINIC | Age: 74
End: 2024-07-01
Payer: MEDICARE

## 2024-07-01 ENCOUNTER — APPOINTMENT (OUTPATIENT)
Dept: DERMATOLOGY | Facility: CLINIC | Age: 74
End: 2024-07-01
Payer: MEDICARE

## 2024-07-01 DIAGNOSIS — L57.0 ACTINIC KERATOSIS: ICD-10-CM

## 2024-07-01 DIAGNOSIS — M24.552 LEFT HIP FLEXOR TIGHTNESS: ICD-10-CM

## 2024-07-01 DIAGNOSIS — M99.02 SEGMENTAL AND SOMATIC DYSFUNCTION OF THORACIC REGION: ICD-10-CM

## 2024-07-01 DIAGNOSIS — M54.2 CERVICALGIA: ICD-10-CM

## 2024-07-01 DIAGNOSIS — M99.01 SEGMENTAL DYSFUNCTION OF CERVICAL REGION: ICD-10-CM

## 2024-07-01 DIAGNOSIS — L57.8 DIFFUSE PHOTODAMAGE OF SKIN: ICD-10-CM

## 2024-07-01 DIAGNOSIS — D48.5 NEOPLASM OF UNCERTAIN BEHAVIOR OF SKIN: Primary | ICD-10-CM

## 2024-07-01 DIAGNOSIS — M54.50 CHRONIC BILATERAL LOW BACK PAIN WITHOUT SCIATICA: ICD-10-CM

## 2024-07-01 DIAGNOSIS — D22.5 MELANOCYTIC NEVUS OF TRUNK: ICD-10-CM

## 2024-07-01 DIAGNOSIS — G89.29 CHRONIC BILATERAL LOW BACK PAIN WITHOUT SCIATICA: ICD-10-CM

## 2024-07-01 DIAGNOSIS — M99.03 SEGMENTAL AND SOMATIC DYSFUNCTION OF LUMBAR REGION: Primary | ICD-10-CM

## 2024-07-01 DIAGNOSIS — L82.1 SEBORRHEIC KERATOSIS: ICD-10-CM

## 2024-07-01 DIAGNOSIS — L21.9 SEBORRHEIC DERMATITIS: ICD-10-CM

## 2024-07-01 DIAGNOSIS — Z85.828 HISTORY OF NONMELANOMA SKIN CANCER: ICD-10-CM

## 2024-07-01 DIAGNOSIS — L81.4 LENTIGO: ICD-10-CM

## 2024-07-01 PROCEDURE — 11103 TANGNTL BX SKIN EA SEP/ADDL: CPT | Performed by: DERMATOLOGY

## 2024-07-01 PROCEDURE — 98941 CHIROPRACT MANJ 3-4 REGIONS: CPT | Performed by: CHIROPRACTOR

## 2024-07-01 PROCEDURE — 1036F TOBACCO NON-USER: CPT | Performed by: DERMATOLOGY

## 2024-07-01 PROCEDURE — 99214 OFFICE O/P EST MOD 30 MIN: CPT | Performed by: DERMATOLOGY

## 2024-07-01 PROCEDURE — 1159F MED LIST DOCD IN RCRD: CPT | Performed by: DERMATOLOGY

## 2024-07-01 PROCEDURE — 11102 TANGNTL BX SKIN SINGLE LES: CPT | Performed by: DERMATOLOGY

## 2024-07-01 PROCEDURE — 17003 DESTRUCT PREMALG LES 2-14: CPT | Performed by: DERMATOLOGY

## 2024-07-01 PROCEDURE — 17000 DESTRUCT PREMALG LESION: CPT | Performed by: DERMATOLOGY

## 2024-07-01 ASSESSMENT — DERMATOLOGY PATIENT ASSESSMENT
ARE YOU TRYING TO GET PREGNANT: NO
DO YOU HAVE IRREGULAR MENSTRUAL CYCLES: NO
DO YOU HAVE ANY NEW OR CHANGING LESIONS: NO
DO YOU USE A TANNING BED: NO
ARE YOU AN ORGAN TRANSPLANT RECIPIENT: NO
ARE YOU ON BIRTH CONTROL: NO
DO YOU USE SUNSCREEN: DAILY

## 2024-07-01 ASSESSMENT — ITCH NUMERIC RATING SCALE: HOW SEVERE IS YOUR ITCHING?: 0

## 2024-07-01 ASSESSMENT — DERMATOLOGY QUALITY OF LIFE (QOL) ASSESSMENT: ARE THERE EXCLUSIONS OR EXCEPTIONS FOR THE QUALITY OF LIFE ASSESSMENT: NO

## 2024-07-01 NOTE — PROGRESS NOTES
Subjective   Patient ID: Taylor Kerns is a 73 y.o. female who presents July 1, 2024 for lower back and neck pain.    MCR    Today, the patient rates their degree of pain as a 6 out of 10 on the numeric pain rating scale.     HPI : Taylor presents to my office with main complaint of lower back and left hip pain. Main complaint is lower back and left anterior hip tension. No radicular complaints reported. Secondary complaint of neck and upper back tension. Notes stress preparing for a medical appointment and upcoming trip to [facility]. Denies new trauma/incident.       Objective   Physical Exam  Neurological:      General: No focal deficit present.      Mental Status: She is alert and oriented to person, place, and time.      Cranial Nerves: No dysarthria or facial asymmetry.      Sensory: Sensation is intact.      Motor: Motor function is intact.      Coordination: Coordination is intact.      Gait: Gait is intact. Gait normal.       Palpation of the following region(s) revealed:  Cervical: Upper trapezius bilateral, muscular hypertonicity.  Cervical paraspinals bilateral, muscular hypertonicity.  Thoracic: Thoracic paraspinals bilateral, muscular hypertonicity.  Middle trapezius bilateral, muscular hypertonicity.  Pectoralis bilateral, muscular hypertonicity.  Lumbar: Lumbar paraspinals right, hypertonicity and tenderness.  Quadratus lumborum bilateral, hypertonicity and tenderness.  Gluteal right, hypertonicity and tenderness.  Psoas left, hypertonicity and tenderness and spasm.        Segmental Joint(s): Segmental joint dysfunction was assessed with motion palpation and is identified in the following areas:  Cervical : C4 C5  Thoracic : T3, T4, and T7  Lumbopelvic / Sacral SIJ : L4, L5/S1, R SIJ, and L SIJ      Assessment/Plan   Today's Treatment Included: Chiropractic manipulation to the Segmental Joint(s) Cervical : C4 C5 Segmental Joint(s) Lumbopelvic/Sacral SIJ : L4, L5/S1, R SIJ, and L SIJ Segmental  Joint(s) Thoracic : T3, T4, and T7   Treatment Techniques Used : Direct Non-force technique, Activator/Tool assisted technique, Pelvic blocking technique, and Low force    Soft-tissue mobilization was performed in the following areas:   Cervical paraspinal mm. bilateral and Upper Trapezius bilateral  Thoracic Paraspinal mm. bilateral, Rhomboids bilateral, and Pectoralis bilateral  Lumbar Paraspinal mm. bilateral, Quadratus Lumborum bilateral, and Psoas bilateral    Supine and SL ART release to L psoas (L>R); STM to L adductors    Demonstrated standing hip flexor stretch     F/U in 2-3 weeks after return from trip    The patient tolerated today's treatment with little or no additional discomfort and was instructed to contact the office for questions or concerns.

## 2024-07-02 ENCOUNTER — TELEPHONE (OUTPATIENT)
Dept: DERMATOLOGY | Facility: CLINIC | Age: 74
End: 2024-07-02
Payer: MEDICARE

## 2024-07-02 DIAGNOSIS — L30.4 INTERTRIGO: ICD-10-CM

## 2024-07-02 RX ORDER — KETOCONAZOLE 20 MG/G
CREAM TOPICAL 2 TIMES DAILY
Qty: 60 G | Refills: 11 | Status: SHIPPED | OUTPATIENT
Start: 2024-07-02 | End: 2024-07-30

## 2024-07-02 NOTE — TELEPHONE ENCOUNTER
Pt left VM that she was seen yesterday and Dr Nuno was to send prescription to her [facility] , they never received any scripts ..Ketoconozole 2%cr apply bid to [facility] of face ???

## 2024-07-03 ENCOUNTER — APPOINTMENT (OUTPATIENT)
Dept: BEHAVIORAL HEALTH | Facility: CLINIC | Age: 74
End: 2024-07-03
Payer: MEDICARE

## 2024-07-03 DIAGNOSIS — F33.41 RECURRENT MAJOR DEPRESSIVE DISORDER, IN PARTIAL REMISSION (CMS-HCC): ICD-10-CM

## 2024-07-03 DIAGNOSIS — F51.04 CHRONIC INSOMNIA: ICD-10-CM

## 2024-07-03 DIAGNOSIS — F41.9 ANXIETY: Primary | ICD-10-CM

## 2024-07-03 LAB
LABORATORY COMMENT REPORT: NORMAL
PATH REPORT.FINAL DX SPEC: NORMAL
PATH REPORT.GROSS SPEC: NORMAL
PATH REPORT.MICROSCOPIC SPEC OTHER STN: NORMAL
PATH REPORT.RELEVANT HX SPEC: NORMAL
PATH REPORT.TOTAL CANCER: NORMAL

## 2024-07-03 PROCEDURE — 99213 OFFICE O/P EST LOW 20 MIN: CPT | Performed by: PSYCHIATRY & NEUROLOGY

## 2024-07-03 RX ORDER — BUSPIRONE HYDROCHLORIDE 5 MG/1
5 TABLET ORAL 2 TIMES DAILY
Qty: 180 TABLET | Refills: 0 | Status: SHIPPED | OUTPATIENT
Start: 2024-07-03 | End: 2024-10-01

## 2024-07-03 ASSESSMENT — ENCOUNTER SYMPTOMS: NERVOUS/ANXIOUS: 1

## 2024-07-03 NOTE — PROGRESS NOTES
"Adult Ambulatory Psychiatry Progress Note  Virtual or Telephone Consent    An interactive audio and video telecommunication system which permits real time communications between the patient (at the originating site) and provider (at the distant site) was utilized to provide this telehealth service.   Verbal consent was requested and obtained from Taylor Kerns on this date, 07/05/24 for a telehealth visit.      Assessment/Plan     Impression:  Taylor Kerns is a 73 y.o. 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     Plan:   Depression - abilify 2mg daily  Anxiety/insomnia - buspar 2.5mg in morning, gabapentin 100-200mg qhs, ativan 0.25mg QID prn      Subjective   Chief Complaint: Depression, Anxiety, and Insomnia  HPI:  Pt arrived on time. Mood \"inconsistent\" since last session. She has good days and anxious days. She's feeling \"jittery\" inside. Some days she takes buspar twice a day. She notices that swimming helps but it's far so she can't go daily. She still plans to go to [place]. She's open to increasing the buspar dose.       OARRS:  Chuck Cantu MD on 7/3/2024  1:39 PM  I have personally reviewed the OARRS report for Taylor Kerns. I have considered the risks of abuse, dependence, addiction and diversion    Is the patient prescribed a combination of a benzodiazepine and opioid?  No      Last Urine Drug Screen / ordered today: No  Recent Results (from the past 8760 hour(s))   Confirmation Opiate/Opioid/Benzo Prescription Compliance    Collection Time: 06/14/24 10:07 AM   Result Value Ref Range    Clonazepam <25 <25 ng/mL    7-Aminoclonazepam <25 <25 ng/mL    Alprazolam <25 <25 ng/mL    Alpha-Hydroxyalprazolam <25 <25 ng/mL    Midazolam <25 <25 ng/mL    Alpha-Hydroxymidazolam <25 <25 ng/mL    Chlordiazepoxide <25 <25 ng/mL    Diazepam <25 <25 ng/mL    Nordiazepam <25 <25 ng/mL    Temazepam <25 <25 ng/mL    Oxazepam " "<25 <25 ng/mL    Lorazepam 693 (H) <25 ng/mL    Methadone <25 <25 ng/mL    EDDP <25 <25 ng/mL    6-Acetylmorphine <25 <25 ng/mL    Codeine <50 <50 ng/mL    Hydrocodone <25 <25 ng/mL    Hydromorphone <25 <25 ng/mL    Morphine  <50 <50 ng/mL    Norhydrocodone <25 <25 ng/mL    Noroxycodone <25 <25 ng/mL    Oxycodone <25 <25 ng/mL    Oxymorphone <25 <25 ng/mL    Fentanyl <2.5 <2.5 ng/mL    Norfentanyl <2.5 <2.5 ng/mL    Tramadol <50 <50 ng/mL    O-Desmethyltramadol <50 <50 ng/mL    Zolpidem <25 <25 ng/mL    Zolpidem Metabolite (ZCA) <25 <25 ng/mL   Screen Opiate/Opioid/Benzo Prescription Compliance    Collection Time: 06/14/24 10:07 AM   Result Value Ref Range    Creatinine, Urine Random 102.7 20.0 - 320.0 mg/dL    Amphetamine Screen, Urine Presumptive Negative Presumptive Negative    Barbiturate Screen, Urine Presumptive Negative Presumptive Negative    Cannabinoid Screen, Urine Presumptive Negative Presumptive Negative    Cocaine Metabolite Screen, Urine Presumptive Negative Presumptive Negative    PCP Screen, Urine Presumptive Negative Presumptive Negative     Results are as expected.       Controlled Substance Agreement:  Date of the Last Agreement: Not done today due to time constraints; will attempt to walk through e-signing next month  Reviewed Controlled Substance Agreement including but not limited to the benefits, risks, and alternatives to treatment with a Controlled Substance medication(s).      Review of Systems   Psychiatric/Behavioral:  Negative for dysphoric mood and suicidal ideas. The patient is nervous/anxious.            Objective   Mental Status Exam:  General Appearance: Well groomed, appropriate eye contact  Attitude/Behavior: Cooperative  Motor: No psychomotor agitation or retardation, no tremor or other abnormal movements  Speech: Normal rate, volume, prosody  Mood: \"inconsistent\"  Affect: Dysphoric, constricted but reactive  Thought Process: Linear, goal directed  Thought Associations: No " loosening of associations  Thought Content: Normal  Perception: No perceptual abnormalities noted  Insight: Intact  Judgement: Intact    Vitals:  There were no vitals filed for this visit.    Current Medications:  Current Outpatient Medications on File Prior to Visit   Medication Sig Dispense Refill    ARIPiprazole (Abilify) 2 mg tablet Take 1 tablet (2 mg) by mouth once daily. 90 tablet 1    gabapentin (Neurontin) 100 mg capsule Take 1 capsule (100 mg) by mouth as needed at bedtime (insomnia). 90 capsule 0    hydroquinone 4 % cream Apply as directed  Basilio Hernandez MD      ketoconazole (NIZOral) 2 % cream Apply topically 2 times a day for 28 days. To affected areas of face and ears 60 g 11    LORazepam (Ativan) 1 mg tablet Take 0.5 tablets (0.5 mg) by mouth 2 times a day as needed for anxiety. 30 tablet 2    lubiprostone (Amitiza) 24 mcg capsule Take 1 capsule (24 mcg) by mouth.      MULTIVITAMIN ORAL Take by mouth.      tretinoin (Retin-A) 0.1 % cream Apply topically once daily at bedtime.      [DISCONTINUED] busPIRone (Buspar) 5 mg tablet Take 0.5 tablets (2.5 mg) by mouth once daily in the morning. 45 tablet 0     No current facility-administered medications on file prior to visit.         Orders:  Diagnoses and all orders for this visit:  Anxiety  -     busPIRone (Buspar) 5 mg tablet; Take 1 tablet (5 mg) by mouth 2 times a day.  Recurrent major depressive disorder, in partial remission (CMS-HCC)  Chronic insomnia        Risk Assessment:  Risk of harm to self: Low Risk -- Risk factors include: Age and Depression Protective factors include:Denies current suicidal ideation, Denies history of suicide attempts , Future-oriented talk , Willingness to seek help and support , Gender, Skills in problem solving, conflict resolution, and nonviolent handling of disputes, Cultural and Religion beliefs that discourage suicide and support self-preservation , Access to a variety of clinical interventions , Receiving and engaged  in care for mental, physical, and substance use disorders , History of adhering to treatment recommendations and/or prescribed medication regimen , Support through ongoing medical and mental healthcare relationships , Current/history of good response to treatment/meds , and Interpersonal relationships and supports, e.g., family, friends, peers, community     Risk of harm to others: Low Risk - Risk factors include: No significant risk factors identified on screening. Protective factors include: Lack of known history of harm to others , Lack of known history of violent ideation , Lack of known access to firearms , Sense of community, availability/access to resources and support , Sense of optimism, hope , Interpersonal competence , Affect regulation , Sense of self-efficacy, internal locus of control , and Positive, pro-social family/peer network         Next Appointment:  Follow up in 4 weeks (on 7/31/2024).

## 2024-07-05 ENCOUNTER — TELEPHONE (OUTPATIENT)
Dept: DERMATOLOGY | Facility: CLINIC | Age: 74
End: 2024-07-05
Payer: MEDICARE

## 2024-07-05 ASSESSMENT — ENCOUNTER SYMPTOMS: DYSPHORIC MOOD: 0

## 2024-07-05 NOTE — TELEPHONE ENCOUNTER
I returned call to pt she is concerned for her biopsy results....... because she is [place] go [place] on [place] and wanted to get schedule on the books before she leaves ...

## 2024-07-24 ENCOUNTER — APPOINTMENT (OUTPATIENT)
Dept: INTEGRATIVE MEDICINE | Facility: CLINIC | Age: 74
End: 2024-07-24
Payer: MEDICARE

## 2024-07-24 DIAGNOSIS — M99.03 SEGMENTAL AND SOMATIC DYSFUNCTION OF LUMBAR REGION: Primary | ICD-10-CM

## 2024-07-24 DIAGNOSIS — M79.18 MYALGIA, OTHER SITE: ICD-10-CM

## 2024-07-24 DIAGNOSIS — M99.01 SEGMENTAL DYSFUNCTION OF CERVICAL REGION: ICD-10-CM

## 2024-07-24 DIAGNOSIS — M99.02 SEGMENTAL AND SOMATIC DYSFUNCTION OF THORACIC REGION: ICD-10-CM

## 2024-07-24 DIAGNOSIS — G89.29 CHRONIC BILATERAL LOW BACK PAIN WITHOUT SCIATICA: ICD-10-CM

## 2024-07-24 DIAGNOSIS — M54.50 CHRONIC BILATERAL LOW BACK PAIN WITHOUT SCIATICA: ICD-10-CM

## 2024-07-24 DIAGNOSIS — M54.2 CERVICALGIA: ICD-10-CM

## 2024-07-24 PROCEDURE — 98941 CHIROPRACT MANJ 3-4 REGIONS: CPT | Performed by: CHIROPRACTOR

## 2024-07-24 NOTE — PROGRESS NOTES
Subjective   Patient ID: Taylor Kerns is a 74 y.o. female who presents July 24, 2024 for lower back and neck pain.    MCR    Today, the patient rates their degree of pain as a 5 out of 10 on the numeric pain rating scale.     HPI : Taylor presents to my office with main complaint of lower back and neck pain. She recently returned home from a trip to [place]. Travel was extremely difficult due to the recent [employer] issues and she was delayed coming home two days. She also reports getting food poisoning from the plane ride home and was sick for 1-2 days after. She reports that she feels her back held up relatively well despite the travel and activity. Notes generalized lower back and neck, upper back tension. No radicular complaints reported. Will be establishing with a new PCP in the near future. Denies new trauma/incident.       Objective   Physical Exam  Neurological:      General: No focal deficit present.      Mental Status: She is alert and oriented to person, place, and time.      Cranial Nerves: No dysarthria or facial asymmetry.      Sensory: Sensation is intact.      Motor: Motor function is intact.      Coordination: Coordination is intact.      Gait: Gait is intact. Gait normal.       Palpation of the following region(s) revealed:  Cervical: Upper trapezius bilateral, muscular hypertonicity.  Cervical paraspinals bilateral, muscular hypertonicity.  Thoracic: Thoracic paraspinals bilateral, muscular hypertonicity.  Middle trapezius bilateral, muscular hypertonicity.  Pectoralis bilateral, muscular hypertonicity.  Lumbar: Lumbar paraspinals right, hypertonicity and tenderness.  Quadratus lumborum right, muscular hypertonicity.  Gluteal right, hypertonicity and tenderness.        Segmental Joint(s): Segmental joint dysfunction was assessed with motion palpation and is identified in the following areas:  Cervical : C2 C5  Thoracic : T3, T4, and T7  Lumbopelvic / Sacral SIJ : L4, L5/S1, R SIJ, and L  SIJ      Assessment/Plan   Today's Treatment Included: Chiropractic manipulation to the Segmental Joint(s) Cervical : C2 C5 Segmental Joint(s) Lumbopelvic/Sacral SIJ : L4, L5/S1, R SIJ, and L SIJ Segmental Joint(s) Thoracic : T3, T4, and T7   Treatment Techniques Used : Direct Non-force technique, Activator/Tool assisted technique, Pelvic blocking technique, and Low force    Soft-tissue mobilization was performed in the following areas:   Cervical paraspinal mm. bilateral and Upper Trapezius bilateral  Thoracic Paraspinal mm. bilateral, Rhomboids bilateral, and Pectoralis bilateral  Lumbar Paraspinal mm. bilateral, Quadratus Lumborum bilateral, and Psoas bilateral    F/U in 2-3 weeks    The patient tolerated today's treatment with little or no additional discomfort and was instructed to contact the office for questions or concerns.

## 2024-07-31 ENCOUNTER — APPOINTMENT (OUTPATIENT)
Dept: BEHAVIORAL HEALTH | Facility: CLINIC | Age: 74
End: 2024-07-31
Payer: MEDICARE

## 2024-07-31 DIAGNOSIS — F51.04 CHRONIC INSOMNIA: ICD-10-CM

## 2024-07-31 DIAGNOSIS — F41.9 ANXIETY: ICD-10-CM

## 2024-07-31 DIAGNOSIS — F33.41 RECURRENT MAJOR DEPRESSIVE DISORDER, IN PARTIAL REMISSION (CMS-HCC): Primary | ICD-10-CM

## 2024-07-31 PROCEDURE — 1159F MED LIST DOCD IN RCRD: CPT | Performed by: PSYCHIATRY & NEUROLOGY

## 2024-07-31 PROCEDURE — 99214 OFFICE O/P EST MOD 30 MIN: CPT | Performed by: PSYCHIATRY & NEUROLOGY

## 2024-07-31 PROCEDURE — 1036F TOBACCO NON-USER: CPT | Performed by: PSYCHIATRY & NEUROLOGY

## 2024-07-31 PROCEDURE — 1160F RVW MEDS BY RX/DR IN RCRD: CPT | Performed by: PSYCHIATRY & NEUROLOGY

## 2024-07-31 RX ORDER — MIRTAZAPINE 7.5 MG/1
7.5 TABLET, FILM COATED ORAL NIGHTLY
Qty: 30 TABLET | Refills: 1 | Status: SHIPPED | OUTPATIENT
Start: 2024-07-31 | End: 2024-09-29

## 2024-07-31 ASSESSMENT — ENCOUNTER SYMPTOMS
DYSPHORIC MOOD: 0
NERVOUS/ANXIOUS: 1

## 2024-07-31 NOTE — PROGRESS NOTES
"Adult Ambulatory Psychiatry Progress Note  Virtual or Telephone Consent    An interactive audio and video telecommunication system which permits real time communications between the patient (at the originating site) and provider (at the distant site) was utilized to provide this telehealth service.   Verbal consent was requested and obtained from Taylor Kerns on this date, 07/31/24 for a telehealth visit.      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     Plan:   Depression - abilify 2mg daily  Anxiety/insomnia - buspar 2.5mg in morning, start mirtazapine 7.5mg at bedtime, ativan 0.25mg QID prn      Subjective   Chief Complaint: Depression, Anxiety, and Insomnia  HPI:  Pt arrived on time. She thinks she's having side effects from the medicines she's taking but the anxiety is \"much improved\". She's frustrated because she feels \"low energy\" and low motivation. She thinks she's depressed. In [place] she felt ok. She didn't feel depressed or anxious but she acknowledges she didn't have to think about anything because someone else did all the planning. Normally she overthinks things, worries about what she has to do and how she's going to do it. She's taking the buspar once a day. She's taking the gabapentin 100mg at night. She's still taking it with lorazepam. She's also taking the abilify.       OARRS:  Chuck Cantu MD on 7/31/2024 12:15 PM  I have personally reviewed the OARRS report for Taylor Kerns. I have considered the risks of abuse, dependence, addiction and diversion    Is the patient prescribed a combination of a benzodiazepine and opioid?  No      Last Urine Drug Screen / ordered today: No  Recent Results (from the past 8760 hour(s))   Confirmation Opiate/Opioid/Benzo Prescription Compliance    Collection Time: 06/14/24 10:07 AM   Result Value Ref Range "    Clonazepam <25 <25 ng/mL    7-Aminoclonazepam <25 <25 ng/mL    Alprazolam <25 <25 ng/mL    Alpha-Hydroxyalprazolam <25 <25 ng/mL    Midazolam <25 <25 ng/mL    Alpha-Hydroxymidazolam <25 <25 ng/mL    Chlordiazepoxide <25 <25 ng/mL    Diazepam <25 <25 ng/mL    Nordiazepam <25 <25 ng/mL    Temazepam <25 <25 ng/mL    Oxazepam <25 <25 ng/mL    Lorazepam 693 (H) <25 ng/mL    Methadone <25 <25 ng/mL    EDDP <25 <25 ng/mL    6-Acetylmorphine <25 <25 ng/mL    Codeine <50 <50 ng/mL    Hydrocodone <25 <25 ng/mL    Hydromorphone <25 <25 ng/mL    Morphine  <50 <50 ng/mL    Norhydrocodone <25 <25 ng/mL    Noroxycodone <25 <25 ng/mL    Oxycodone <25 <25 ng/mL    Oxymorphone <25 <25 ng/mL    Fentanyl <2.5 <2.5 ng/mL    Norfentanyl <2.5 <2.5 ng/mL    Tramadol <50 <50 ng/mL    O-Desmethyltramadol <50 <50 ng/mL    Zolpidem <25 <25 ng/mL    Zolpidem Metabolite (ZCA) <25 <25 ng/mL   Screen Opiate/Opioid/Benzo Prescription Compliance    Collection Time: 06/14/24 10:07 AM   Result Value Ref Range    Creatinine, Urine Random 102.7 20.0 - 320.0 mg/dL    Amphetamine Screen, Urine Presumptive Negative Presumptive Negative    Barbiturate Screen, Urine Presumptive Negative Presumptive Negative    Cannabinoid Screen, Urine Presumptive Negative Presumptive Negative    Cocaine Metabolite Screen, Urine Presumptive Negative Presumptive Negative    PCP Screen, Urine Presumptive Negative Presumptive Negative     Results are as expected.       Controlled Substance Agreement:  Date of the Last Agreement: Not done today due to time constraints; will attempt to walk through e-signing next month  Reviewed Controlled Substance Agreement including but not limited to the benefits, risks, and alternatives to treatment with a Controlled Substance medication(s).      Review of Systems   Psychiatric/Behavioral:  Negative for dysphoric mood and suicidal ideas. The patient is nervous/anxious.            Objective   Mental Status Exam:       Vitals:  There were  no vitals filed for this visit.    Current Medications:  Current Outpatient Medications on File Prior to Visit   Medication Sig Dispense Refill    ARIPiprazole (Abilify) 2 mg tablet Take 1 tablet (2 mg) by mouth once daily. 90 tablet 1    busPIRone (Buspar) 5 mg tablet Take 1 tablet (5 mg) by mouth 2 times a day. 180 tablet 0    hydroquinone 4 % cream Apply as directed  Basilio Hernandez MD      [] ketoconazole (NIZOral) 2 % cream Apply topically 2 times a day for 28 days. To affected areas of face and ears 60 g 11    LORazepam (Ativan) 1 mg tablet Take 0.5 tablets (0.5 mg) by mouth 2 times a day as needed for anxiety. 30 tablet 2    lubiprostone (Amitiza) 24 mcg capsule Take 1 capsule (24 mcg) by mouth.      MULTIVITAMIN ORAL Take by mouth.      tretinoin (Retin-A) 0.1 % cream Apply topically once daily at bedtime.      [DISCONTINUED] gabapentin (Neurontin) 100 mg capsule Take 1 capsule (100 mg) by mouth as needed at bedtime (insomnia). 90 capsule 0     No current facility-administered medications on file prior to visit.         Orders:  Diagnoses and all orders for this visit:  Recurrent major depressive disorder, in partial remission (CMS-[facility])  -     mirtazapine (Remeron) 7.5 mg tablet; Take 1 tablet (7.5 mg) by mouth once daily at bedtime.  -     Follow Up In Psychiatry; Future  Anxiety  Chronic insomnia          Risk Assessment:  Risk of harm to self: Low Risk -- Risk factors include: Age and Depression Protective factors include:Denies current suicidal ideation, Denies history of suicide attempts , Future-oriented talk , Willingness to seek help and support , Gender, Skills in problem solving, conflict resolution, and nonviolent handling of disputes, Cultural and Restoration beliefs that discourage suicide and support self-preservation , Access to a variety of clinical interventions , Receiving and engaged in care for mental, physical, and substance use disorders , History of adhering to treatment  recommendations and/or prescribed medication regimen , Support through ongoing medical and mental healthcare relationships , Current/history of good response to treatment/meds , and Interpersonal relationships and supports, e.g., family, friends, peers, community     Risk of harm to others: Low Risk - Risk factors include: No significant risk factors identified on screening. Protective factors include: Lack of known history of harm to others , Lack of known history of violent ideation , Lack of known access to firearms , Sense of community, availability/access to resources and support , Sense of optimism, hope , Interpersonal competence , Affect regulation , Sense of self-efficacy, internal locus of control , and Positive, pro-social family/peer network         Next Appointment:  Follow up in 23 days (on 8/23/2024).

## 2024-08-07 ENCOUNTER — APPOINTMENT (OUTPATIENT)
Dept: INTEGRATIVE MEDICINE | Facility: CLINIC | Age: 74
End: 2024-08-07
Payer: MEDICARE

## 2024-08-07 DIAGNOSIS — R79.89 ABNORMAL TSH: ICD-10-CM

## 2024-08-07 DIAGNOSIS — E03.8 SUBCLINICAL HYPOTHYROIDISM: ICD-10-CM

## 2024-08-07 DIAGNOSIS — R73.9 BORDERLINE HYPERGLYCEMIA: Primary | ICD-10-CM

## 2024-08-07 DIAGNOSIS — E55.9 VITAMIN D DEFICIENCY: ICD-10-CM

## 2024-08-07 DIAGNOSIS — E78.5 DYSLIPIDEMIA: ICD-10-CM

## 2024-08-08 ENCOUNTER — LAB (OUTPATIENT)
Dept: LAB | Facility: LAB | Age: 74
End: 2024-08-08
Payer: MEDICARE

## 2024-08-08 DIAGNOSIS — E78.5 DYSLIPIDEMIA: ICD-10-CM

## 2024-08-08 DIAGNOSIS — E55.9 VITAMIN D DEFICIENCY: ICD-10-CM

## 2024-08-08 DIAGNOSIS — R73.9 BORDERLINE HYPERGLYCEMIA: ICD-10-CM

## 2024-08-08 DIAGNOSIS — E03.8 SUBCLINICAL HYPOTHYROIDISM: ICD-10-CM

## 2024-08-08 DIAGNOSIS — R79.89 ABNORMAL TSH: ICD-10-CM

## 2024-08-08 LAB
25(OH)D3 SERPL-MCNC: 60 NG/ML (ref 30–100)
ALBUMIN SERPL BCP-MCNC: 4 G/DL (ref 3.4–5)
ALP SERPL-CCNC: 70 U/L (ref 33–136)
ALT SERPL W P-5'-P-CCNC: 12 U/L (ref 7–45)
ANION GAP SERPL CALC-SCNC: 12 MMOL/L (ref 10–20)
AST SERPL W P-5'-P-CCNC: 18 U/L (ref 9–39)
BASOPHILS # BLD AUTO: 0.06 X10*3/UL (ref 0–0.1)
BASOPHILS NFR BLD AUTO: 1 %
BILIRUB SERPL-MCNC: 1 MG/DL (ref 0–1.2)
BUN SERPL-MCNC: 16 MG/DL (ref 6–23)
CALCIUM SERPL-MCNC: 9.8 MG/DL (ref 8.6–10.6)
CHLORIDE SERPL-SCNC: 104 MMOL/L (ref 98–107)
CHOLEST SERPL-MCNC: 243 MG/DL (ref 0–199)
CHOLESTEROL/HDL RATIO: 3.5
CO2 SERPL-SCNC: 30 MMOL/L (ref 21–32)
CREAT SERPL-MCNC: 0.74 MG/DL (ref 0.5–1.05)
EGFRCR SERPLBLD CKD-EPI 2021: 86 ML/MIN/1.73M*2
EOSINOPHIL # BLD AUTO: 0.09 X10*3/UL (ref 0–0.4)
EOSINOPHIL NFR BLD AUTO: 1.5 %
ERYTHROCYTE [DISTWIDTH] IN BLOOD BY AUTOMATED COUNT: 11.9 % (ref 11.5–14.5)
EST. AVERAGE GLUCOSE BLD GHB EST-MCNC: 120 MG/DL
GLUCOSE SERPL-MCNC: 108 MG/DL (ref 74–99)
HBA1C MFR BLD: 5.8 %
HCT VFR BLD AUTO: 42.7 % (ref 36–46)
HDLC SERPL-MCNC: 69.7 MG/DL
HGB BLD-MCNC: 14 G/DL (ref 12–16)
IMM GRANULOCYTES # BLD AUTO: 0.01 X10*3/UL (ref 0–0.5)
IMM GRANULOCYTES NFR BLD AUTO: 0.2 % (ref 0–0.9)
LDLC SERPL CALC-MCNC: 149 MG/DL
LYMPHOCYTES # BLD AUTO: 1.85 X10*3/UL (ref 0.8–3)
LYMPHOCYTES NFR BLD AUTO: 31.5 %
MCH RBC QN AUTO: 31.7 PG (ref 26–34)
MCHC RBC AUTO-ENTMCNC: 32.8 G/DL (ref 32–36)
MCV RBC AUTO: 97 FL (ref 80–100)
MONOCYTES # BLD AUTO: 0.42 X10*3/UL (ref 0.05–0.8)
MONOCYTES NFR BLD AUTO: 7.1 %
NEUTROPHILS # BLD AUTO: 3.45 X10*3/UL (ref 1.6–5.5)
NEUTROPHILS NFR BLD AUTO: 58.7 %
NON HDL CHOLESTEROL: 173 MG/DL (ref 0–149)
NRBC BLD-RTO: 0 /100 WBCS (ref 0–0)
PLATELET # BLD AUTO: 407 X10*3/UL (ref 150–450)
POTASSIUM SERPL-SCNC: 4.2 MMOL/L (ref 3.5–5.3)
PROT SERPL-MCNC: 6.8 G/DL (ref 6.4–8.2)
RBC # BLD AUTO: 4.41 X10*6/UL (ref 4–5.2)
SODIUM SERPL-SCNC: 142 MMOL/L (ref 136–145)
TRIGL SERPL-MCNC: 123 MG/DL (ref 0–149)
TSH SERPL-ACNC: 3.88 MIU/L (ref 0.44–3.98)
VLDL: 25 MG/DL (ref 0–40)
WBC # BLD AUTO: 5.9 X10*3/UL (ref 4.4–11.3)

## 2024-08-08 PROCEDURE — 85025 COMPLETE CBC W/AUTO DIFF WBC: CPT

## 2024-08-08 PROCEDURE — 36415 COLL VENOUS BLD VENIPUNCTURE: CPT

## 2024-08-08 PROCEDURE — 80061 LIPID PANEL: CPT

## 2024-08-08 PROCEDURE — 82306 VITAMIN D 25 HYDROXY: CPT

## 2024-08-08 PROCEDURE — 80053 COMPREHEN METABOLIC PANEL: CPT

## 2024-08-08 PROCEDURE — 83036 HEMOGLOBIN GLYCOSYLATED A1C: CPT

## 2024-08-08 PROCEDURE — 84443 ASSAY THYROID STIM HORMONE: CPT

## 2024-08-15 ENCOUNTER — APPOINTMENT (OUTPATIENT)
Dept: PRIMARY CARE | Facility: CLINIC | Age: 74
End: 2024-08-15
Payer: MEDICARE

## 2024-08-15 VITALS
HEART RATE: 67 BPM | SYSTOLIC BLOOD PRESSURE: 107 MMHG | WEIGHT: 115 LBS | HEIGHT: 66 IN | OXYGEN SATURATION: 98 % | BODY MASS INDEX: 18.48 KG/M2 | DIASTOLIC BLOOD PRESSURE: 70 MMHG

## 2024-08-15 DIAGNOSIS — Z78.0 POSTMENOPAUSAL: ICD-10-CM

## 2024-08-15 DIAGNOSIS — Z00.00 ENCOUNTER FOR MEDICAL EXAMINATION TO ESTABLISH CARE: Primary | ICD-10-CM

## 2024-08-15 DIAGNOSIS — L65.8 FEMALE PATTERN HAIR LOSS: ICD-10-CM

## 2024-08-15 DIAGNOSIS — F41.1 GENERALIZED ANXIETY DISORDER: ICD-10-CM

## 2024-08-15 DIAGNOSIS — Z12.31 ENCOUNTER FOR SCREENING MAMMOGRAM FOR MALIGNANT NEOPLASM OF BREAST: ICD-10-CM

## 2024-08-15 DIAGNOSIS — E78.5 DYSLIPIDEMIA: ICD-10-CM

## 2024-08-15 PROCEDURE — 99214 OFFICE O/P EST MOD 30 MIN: CPT | Performed by: STUDENT IN AN ORGANIZED HEALTH CARE EDUCATION/TRAINING PROGRAM

## 2024-08-15 PROCEDURE — 3008F BODY MASS INDEX DOCD: CPT | Performed by: STUDENT IN AN ORGANIZED HEALTH CARE EDUCATION/TRAINING PROGRAM

## 2024-08-15 RX ORDER — MINOXIDIL 2 %
SOLUTION, NON-ORAL TOPICAL 2 TIMES DAILY
Qty: 180 ML | Refills: 1 | Status: SHIPPED | OUTPATIENT
Start: 2024-08-15 | End: 2024-08-18 | Stop reason: SDUPTHER

## 2024-08-15 NOTE — PROGRESS NOTES
Taylor Kerns is a 74 y.o. female seen in Clinic at [facility] by Dr. Noble Llanos on 08/15/24 for routine care, as well as for management of the following chronic medical conditions: TELMA, insomnia, DLD, subclinical hypothyroidism, BCC, SCC, endometrial cancer s/p NATALIYA, BSO, knee pain. Patient presents today to establish care.    Recently underwent blepharoplasty Monday at [facility]. Recovering well.     Notes 'set back' in June regarding her mental health. Endorsed worsening anxiety with the extreme heat in [place] as well as all of [place]. She also self-discontinued Abilify at the time. Is excited to meet with her therapist today.     CHRONIC MEDICAL ISSUES:    #TELMA, Insomnia  - previously trialed on sertraline, fluoxetine, wellbutrin in the past without efficacy and with intolerances like increased anxiety or activation   - follows with psychiatry, Dr. Mcgrath at [facility]  - current regimen: Abilify 2mg, Buspirone 5mg BID, Lorazepam 0.5mg nightly (takes once at night)  [ ] amenable to starting remeron 7.5mg at bedtime as recommended by psychiatrist, discussed benefits including weight gain and augmenting effect of Abilify   [ ] meeting with therapist today     #DLD  - ASCVD 8.6%, CAC score 0 9/2022  - lipid panel total cholesterol 243 <- 283,  <- 184 8/2024, both improved   - qualifies for statin, but declines   - ctm      # Endometrial ca s/p NATALIYA BSO  - follows with gyn/onc (Dr. Jackman at [facility])     #BCC  #SCC  - follows with dermatology at [facility]   [ ] scheduled for Mohs surgery for SCC on nose 9/27, SCC in L arm as well that will be removed in office    #External hemorrhoids, internal hemorrhoids s/p banding   - banding completed in [place] 2/2023 during C-scope at [facility] (Dr. Bradley Dumont)  - no blood in stools, constipation, abdominal pain   - Hb stable, 14 8/2024  [ ] advised to call hospital and get C-scope records    #IBS  - no issues  with abdominal pain, diarrhea, constipation  - off lubipristone now    #Knee pain s/p MRI (.22 complex meniscal tear and cartilage loss  -stable     #Interval hair loss  -recent screening labs 8/2024 wnl   [ ] will trial topical minoxidil      #Subclinical hypothyroidism, resolved  - TSH 3.88 8/2024, previous TSH values were elevated    Subspecialty Medical Care: psychiatry, dermatology, gyn/onc    Past Surgical History:   Past Surgical History:   Procedure Laterality Date    OTHER SURGICAL HISTORY  10/16/2019    Hysterectomy robotic     Medications:   Current Outpatient Medications:     ARIPiprazole (Abilify) 2 mg tablet, Take 1 tablet (2 mg) by mouth once daily., Disp: 90 tablet, Rfl: 1    busPIRone (Buspar) 5 mg tablet, Take 1 tablet (5 mg) by mouth 2 times a day., Disp: 180 tablet, Rfl: 0    hydroquinone 4 % cream, Apply as directed Basilio Hrenandez MD, Disp: , Rfl:     LORazepam (Ativan) 1 mg tablet, Take 0.5 tablets (0.5 mg) by mouth 2 times a day as needed for anxiety. (Patient taking differently: Take 0.5 tablets (0.5 mg) by mouth once daily at bedtime.), Disp: 30 tablet, Rfl: 2    minoxidil (Rogaine) 2 % external solution, Apply topically 2 times a day., Disp: 180 mL, Rfl: 1    mirtazapine (Remeron) 7.5 mg tablet, Take 1 tablet (7.5 mg) by mouth once daily at bedtime., Disp: 30 tablet, Rfl: 1    MULTIVITAMIN ORAL, Take by mouth., Disp: , Rfl:     tretinoin (Retin-A) 0.1 % cream, Apply topically once daily at bedtime., Disp: , Rfl:   Pharmacy: [facility] in [place]    Allergies:   Allergies   Allergen Reactions    Amoxicillin Diarrhea and Hives     Immunizations: as below    Family History:   Grandfather - bone cancer   Maternal grandmother - heart disease, diverticulosis  Father - Lewy Body dementia  Paternal grandmother - Alzheimer's  Family History   Problem Relation Name Age of Onset    Suicidality Paternal Grandfather       Social History:   Home/Living Situation/Falls/Safety Assessment: lives in [place] 8 months  "of the year and [place] for the summer  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, retired 2019  Activities: exercise, walk, run, Pilates, reading  Drug Use: no smoking, no drugs, no alcohol  Diet: very healthy  Depression/Anxiety: as above  Sexuality/Contraception/Menstrual History: s/p NATALIYA/BSO  Sleep: no issues    Patient Information:  Health Insurance: [employer]  Transportation: car  Healthcare POA/Guardian:   Contact Information:   Other:     Visit Vitals  /70   Pulse 67   Ht 1.676 m (5' 6\")   Wt 52.2 kg (115 lb)   SpO2 98%   BMI 18.56 kg/m²   Smoking Status Former   BSA 1.56 m²      PHYSICAL EXAM:   General: well appearing  female, NAD, pleasant and engaged in encounter    HEENT: NCAT, MMM, SCC on L nose  CV: RRR, no m/r/g  PULM: CTAB, non-labored respirations   ABD: soft, NT, ND, + bowel sounds   : no suprapubic or CVA tenderness   EXT: WWP, no significant edema   SKIN: SCC on L shoulder  NEURO: A&Ox4, symmetric facies, no gross motor or sensory deficits, normal gait  PSYCH: pleasant mood, appropriate affect     Assessment/Plan    Taylor Kerns is a 74 y.o. female seen in Clinic at [facility] by Dr. Noble Llanos on 08/15/24 for routine care, as well as for management of the following chronic medical conditions: TELMA, insomnia, DLD, subclinical hypothyroidism, BCC, SCC, endometrial cancer s/p NATALIYA, BSO, knee pain. Patient presents today to establish care.    CHRONIC MEDICAL ISSUES:    #TELMA, Insomnia  - previously trialed on sertraline, fluoxetine, wellbutrin in the past without efficacy and with intolerances like increased anxiety or activation   - follows with psychiatry, Dr. Mcgrath at [facility]  - current regimen: Abilify 2mg, Buspirone 5mg BID, Lorazepam 0.5mg nightly (takes once at night)  [ ] amenable to starting remeron 7.5mg at bedtime as recommended by psychiatrist, discussed benefits including weight gain and augmenting effect of Abilify   [ ] meeting " with therapist today     #DLD  - ASCVD 8.6%, CAC score 0 9/2022  - lipid panel total cholesterol 243 <- 283,  <- 184 8/2024, both improved   - qualifies for statin, but declines   - ctm      # Endometrial ca s/p NATALIYA BSO  - follows with gyn/onc (Dr. Jackman at [facility])     #BCC  #SCC  - follows with dermatology at [facility]   [ ] scheduled for Mohs surgery for SCC on nose 9/27, SCC in L arm as well that will be removed in office    #External hemorrhoids, internal hemorrhoids s/p banding   - banding completed in [place] 2/2023 during C-scope at [facility] (Dr. Bradley Dumont)  - no blood in stools, constipation, abdominal pain   - Hb stable, 14 8/2024  [ ] advised to call hospital and get C-scope records    #IBS  - no issues with abdominal pain, diarrhea, constipation  - off lubipristone now    #Knee pain s/p MRI (.22 complex meniscal tear and cartilage loss  -stable     #Interval hair loss  -recent screening labs 8/2024 wnl   [ ] will trial topical minoxidil      #Subclinical hypothyroidism, resolved  - TSH 3.88 8/2024, previous TSH values were elevated    #Health Maintenance  Cancer Screening  - Cervical Cancer Screening: s/p NATALIYA/BSO  - Mammography: due 10/2024, ordered  - Colorectal Cancer Screening: will obtain C-scope records from hospital in [place]  - Lung Cancer Screening: quit smoking > 50 years ago, not eligible    Laboratory Screening  - Lipid Screen: , ,  8/2024  - ASCVD Score: 8.6%, qualifies for statin however declines based on CAC 0 in 2022  - A1C, glucose screen: A1C 5.8 8/2024  - STI, HIV, Hep B screen: defer  - Hep C screen: defer    Imaging Screening  - Osteoporosis/DEXA screening: ordered    Immunizations:   - Influenza: recommended annually   - COVID: UTD on boosters, due this year  - RSV: advised to consider   - Tdap: completed 2016, due 2026  - Prevnar, Pneumovax: completed (last 2022)   - Shingrix: due, shingrix advised (prior Zostavax  in 2013)     Other Screening  - Health Literacy Assessment: good  - Depression screen: as above  - Home safety/partner violence screen: negative  - Hearing/Vision screens: negative  - Alcohol/tobacco/drug use screen: negative  - Healthcare POA/Advanced Directives: son     Referrals: mammogram, DEXA, topical minoxidil    Return to clinic in 6 months for follow-up.    Patient Discussion:    Please call back the office with any questions at [phone]. In the case of an emergency, please call 911 or go to the nearest Emergency Department.      Noble Llanos MD  Internal Medicine-Pediatrics  [facility] [address], [address]  P: [phone], F: [phone]

## 2024-08-15 NOTE — PATIENT INSTRUCTIONS
Shingles vaccine through your pharmacy  Updated Flu and COVID vaccines this fall    Get your colonoscopy records and have them faxed to us   [phone]    Topical minoxidil for hair loss sent to pharmacy     DEXA and Mammogram ordered  Can call [phone] to schedule or stop in [address]    Very nice to meet you today!    Dr. ADAIR

## 2024-08-18 DIAGNOSIS — L65.8 FEMALE PATTERN HAIR LOSS: ICD-10-CM

## 2024-08-18 RX ORDER — MINOXIDIL 2 %
SOLUTION, NON-ORAL TOPICAL 2 TIMES DAILY
Qty: 180 ML | Refills: 1 | Status: SHIPPED | OUTPATIENT
Start: 2024-08-18

## 2024-08-19 ENCOUNTER — APPOINTMENT (OUTPATIENT)
Dept: INTEGRATIVE MEDICINE | Facility: CLINIC | Age: 74
End: 2024-08-19
Payer: MEDICARE

## 2024-08-23 ENCOUNTER — APPOINTMENT (OUTPATIENT)
Dept: BEHAVIORAL HEALTH | Facility: CLINIC | Age: 74
End: 2024-08-23
Payer: MEDICARE

## 2024-08-23 DIAGNOSIS — F41.9 ANXIETY: ICD-10-CM

## 2024-08-23 DIAGNOSIS — F51.04 CHRONIC INSOMNIA: ICD-10-CM

## 2024-08-23 DIAGNOSIS — F33.41 RECURRENT MAJOR DEPRESSIVE DISORDER, IN PARTIAL REMISSION (CMS-HCC): Primary | ICD-10-CM

## 2024-08-23 PROCEDURE — 99214 OFFICE O/P EST MOD 30 MIN: CPT | Performed by: PSYCHIATRY & NEUROLOGY

## 2024-08-23 PROCEDURE — 1036F TOBACCO NON-USER: CPT | Performed by: PSYCHIATRY & NEUROLOGY

## 2024-08-23 PROCEDURE — 1159F MED LIST DOCD IN RCRD: CPT | Performed by: PSYCHIATRY & NEUROLOGY

## 2024-08-23 PROCEDURE — 1160F RVW MEDS BY RX/DR IN RCRD: CPT | Performed by: PSYCHIATRY & NEUROLOGY

## 2024-08-23 ASSESSMENT — ENCOUNTER SYMPTOMS
DYSPHORIC MOOD: 0
NERVOUS/ANXIOUS: 1

## 2024-08-23 ASSESSMENT — PATIENT HEALTH QUESTIONNAIRE - PHQ9
1. LITTLE INTEREST OR PLEASURE IN DOING THINGS: MORE THAN HALF THE DAYS
8. MOVING OR SPEAKING SO SLOWLY THAT OTHER PEOPLE COULD HAVE NOTICED. OR THE OPPOSITE, BEING SO FIGETY OR RESTLESS THAT YOU HAVE BEEN MOVING AROUND A LOT MORE THAN USUAL: NOT AT ALL
4. FEELING TIRED OR HAVING LITTLE ENERGY: NEARLY EVERY DAY
5. POOR APPETITE OR OVEREATING: NOT AT ALL
SUM OF ALL RESPONSES TO PHQ QUESTIONS 1-9: 10
3. TROUBLE FALLING OR STAYING ASLEEP OR SLEEPING TOO MUCH: NOT AT ALL
10. IF YOU CHECKED OFF ANY PROBLEMS, HOW DIFFICULT HAVE THESE PROBLEMS MADE IT FOR YOU TO DO YOUR WORK, TAKE CARE OF THINGS AT HOME, OR GET ALONG WITH OTHER PEOPLE: VERY DIFFICULT
SUM OF ALL RESPONSES TO PHQ9 QUESTIONS 1 AND 2: 5
9. THOUGHTS THAT YOU WOULD BE BETTER OFF DEAD, OR OF HURTING YOURSELF: SEVERAL DAYS
6. FEELING BAD ABOUT YOURSELF - OR THAT YOU ARE A FAILURE OR HAVE LET YOURSELF OR YOUR FAMILY DOWN: SEVERAL DAYS
2. FEELING DOWN, DEPRESSED OR HOPELESS: NEARLY EVERY DAY
7. TROUBLE CONCENTRATING ON THINGS, SUCH AS READING THE NEWSPAPER OR WATCHING TELEVISION: NOT AT ALL

## 2024-08-23 NOTE — PROGRESS NOTES
"Adult Ambulatory Psychiatry Progress Note  Virtual or Telephone Consent    An interactive audio and video telecommunication system which permits real time communications between the patient (at the originating site) and provider (at the distant site) was utilized to provide this telehealth service.   Verbal consent was requested and obtained from Taylor Kerns on this date, 24 for a telehealth visit.      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     Plan:   Depression - abilify 2mg daily  Anxiety/insomnia - buspar 2.5mg BID, start mirtazapine 7.5mg at bedtime, ativan 0.25mg QID prn      Subjective   Chief Complaint: Depression, Anxiety, and Insomnia  HPI:  Pt arrived on time. Mood \"depressed\". She has occasional passive thoughts that it would be easier if she . They're brief. Denies plan or intent. Denies SI today. She had surgery last week and didn't want to start the mirtazapine at the same time. She started therapy and her anxiety is better. Pt inquired about [facility]. Provided info about P450 system and how to interpret results.       OARRS:  Chuck Cantu MD on 2024  9:26 AM  I have personally reviewed the OARRS report for Taylor Kerns. I have considered the risks of abuse, dependence, addiction and diversion    Is the patient prescribed a combination of a benzodiazepine and opioid?  No      Last Urine Drug Screen / ordered today: No  Recent Results (from the past 8760 hour(s))   Confirmation Opiate/Opioid/Benzo Prescription Compliance    Collection Time: 24 10:07 AM   Result Value Ref Range    Clonazepam <25 <25 ng/mL    7-Aminoclonazepam <25 <25 ng/mL    Alprazolam <25 <25 ng/mL    Alpha-Hydroxyalprazolam <25 <25 ng/mL    Midazolam <25 <25 ng/mL    Alpha-Hydroxymidazolam <25 <25 ng/mL    Chlordiazepoxide <25 <25 ng/mL    Diazepam <25 " "<25 ng/mL    Nordiazepam <25 <25 ng/mL    Temazepam <25 <25 ng/mL    Oxazepam <25 <25 ng/mL    Lorazepam 693 (H) <25 ng/mL    Methadone <25 <25 ng/mL    EDDP <25 <25 ng/mL    6-Acetylmorphine <25 <25 ng/mL    Codeine <50 <50 ng/mL    Hydrocodone <25 <25 ng/mL    Hydromorphone <25 <25 ng/mL    Morphine  <50 <50 ng/mL    Norhydrocodone <25 <25 ng/mL    Noroxycodone <25 <25 ng/mL    Oxycodone <25 <25 ng/mL    Oxymorphone <25 <25 ng/mL    Fentanyl <2.5 <2.5 ng/mL    Norfentanyl <2.5 <2.5 ng/mL    Tramadol <50 <50 ng/mL    O-Desmethyltramadol <50 <50 ng/mL    Zolpidem <25 <25 ng/mL    Zolpidem Metabolite (ZCA) <25 <25 ng/mL   Screen Opiate/Opioid/Benzo Prescription Compliance    Collection Time: 06/14/24 10:07 AM   Result Value Ref Range    Creatinine, Urine Random 102.7 20.0 - 320.0 mg/dL    Amphetamine Screen, Urine Presumptive Negative Presumptive Negative    Barbiturate Screen, Urine Presumptive Negative Presumptive Negative    Cannabinoid Screen, Urine Presumptive Negative Presumptive Negative    Cocaine Metabolite Screen, Urine Presumptive Negative Presumptive Negative    PCP Screen, Urine Presumptive Negative Presumptive Negative     Results are as expected.       Controlled Substance Agreement:  Date of the Last Agreement: Not done today due to time constraints; will attempt to walk through e-signing next month  Reviewed Controlled Substance Agreement including but not limited to the benefits, risks, and alternatives to treatment with a Controlled Substance medication(s).      Review of Systems   Psychiatric/Behavioral:  Negative for dysphoric mood and suicidal ideas. The patient is nervous/anxious.            Objective   Mental Status Exam:  General Appearance: Well groomed, appropriate eye contact  Attitude/Behavior: Cooperative  Motor: No psychomotor agitation or retardation, no tremor or other abnormal movements  Speech: Normal rate, volume, prosody  Mood: \"depressed\"  Affect: Constricted  Thought Process: " Linear, goal directed  Thought Associations: No loosening of associations  Thought Content: Normal  Perception: No perceptual abnormalities noted  Insight: Intact  Judgement: Intact    Vitals:  There were no vitals filed for this visit.    Current Medications:  Current Outpatient Medications on File Prior to Visit   Medication Sig Dispense Refill    ARIPiprazole (Abilify) 2 mg tablet Take 1 tablet (2 mg) by mouth once daily. 90 tablet 1    busPIRone (Buspar) 5 mg tablet Take 1 tablet (5 mg) by mouth 2 times a day. 180 tablet 0    hydroquinone 4 % cream Apply as directed  Basilio Hernandez MD      LORazepam (Ativan) 1 mg tablet Take 0.5 tablets (0.5 mg) by mouth 2 times a day as needed for anxiety. (Patient taking differently: Take 0.5 tablets (0.5 mg) by mouth once daily at bedtime.) 30 tablet 2    minoxidil (Rogaine) 2 % external solution Apply topically 2 times a day. 180 mL 1    mirtazapine (Remeron) 7.5 mg tablet Take 1 tablet (7.5 mg) by mouth once daily at bedtime. 30 tablet 1    MULTIVITAMIN ORAL Take by mouth.      tretinoin (Retin-A) 0.1 % cream Apply topically once daily at bedtime.      [DISCONTINUED] minoxidil (Rogaine) 2 % external solution Apply topically 2 times a day. 180 mL 1     No current facility-administered medications on file prior to visit.         Orders:  Diagnoses and all orders for this visit:  Recurrent major depressive disorder, in partial remission (CMS-[facility]CC)  -     Follow Up In Psychiatry  -     Follow Up In Psychiatry; Future  Anxiety  Chronic insomnia          Risk Assessment:  Risk of harm to self: Low Risk -- Risk factors include: Age and Depression Protective factors include:Denies current suicidal ideation, Denies history of suicide attempts , Future-oriented talk , Willingness to seek help and support , Gender, Skills in problem solving, conflict resolution, and nonviolent handling of disputes, Cultural and Cheondoism beliefs that discourage suicide and support self-preservation ,  Access to a variety of clinical interventions , Receiving and engaged in care for mental, physical, and substance use disorders , History of adhering to treatment recommendations and/or prescribed medication regimen , Support through ongoing medical and mental healthcare relationships , Current/history of good response to treatment/meds , and Interpersonal relationships and supports, e.g., family, friends, peers, community     Risk of harm to others: Low Risk - Risk factors include: No significant risk factors identified on screening. Protective factors include: Lack of known history of harm to others , Lack of known history of violent ideation , Lack of known access to firearms , Sense of community, availability/access to resources and support , Sense of optimism, hope , Interpersonal competence , Affect regulation , Sense of self-efficacy, internal locus of control , and Positive, pro-social family/peer network     PHQ9  Over the past 2 weeks, how often have you been bothered by any of the following problems?  Little interest or pleasure in doing things: More than half the days  Feeling down, depressed, or hopeless: Nearly every day  Trouble falling or staying asleep, or sleeping too much: Not at all  Feeling tired or having little energy: Nearly every day  Poor appetite or overeating: Not at all  Feeling bad about yourself - or that you are a failure or have let yourself or your family down: Several days  Trouble concentrating on things, such as reading the newspaper or watching television: Not at all  Moving or speaking so slowly that other people could have noticed? Or the opposite - being so fidgety or restless that you have been moving around a lot more than usual.: Not at all  Thoughts that you would be better off dead or hurting yourself in some way: Several days  Patient Health Questionnaire-9 Score: 10        Next Appointment:  Follow up in 4 weeks (on 9/20/2024).

## 2024-08-26 ENCOUNTER — ALLIED HEALTH (OUTPATIENT)
Dept: INTEGRATIVE MEDICINE | Facility: CLINIC | Age: 74
End: 2024-08-26
Payer: MEDICARE

## 2024-08-26 ENCOUNTER — APPOINTMENT (OUTPATIENT)
Dept: DERMATOLOGY | Facility: CLINIC | Age: 74
End: 2024-08-26
Payer: MEDICARE

## 2024-08-26 DIAGNOSIS — M99.04 SEGMENTAL AND SOMATIC DYSFUNCTION OF SACRAL REGION: ICD-10-CM

## 2024-08-26 DIAGNOSIS — C44.92 SQUAMOUS CELL CARCINOMA OF SKIN: Primary | ICD-10-CM

## 2024-08-26 DIAGNOSIS — M99.03 SEGMENTAL AND SOMATIC DYSFUNCTION OF LUMBAR REGION: Primary | ICD-10-CM

## 2024-08-26 DIAGNOSIS — M99.01 SEGMENTAL DYSFUNCTION OF CERVICAL REGION: ICD-10-CM

## 2024-08-26 DIAGNOSIS — L82.1 SEBORRHEIC KERATOSIS: ICD-10-CM

## 2024-08-26 DIAGNOSIS — S76.011A STRAIN OF FLEXOR MUSCLE OF RIGHT HIP, INITIAL ENCOUNTER: ICD-10-CM

## 2024-08-26 DIAGNOSIS — M99.02 SEGMENTAL AND SOMATIC DYSFUNCTION OF THORACIC REGION: ICD-10-CM

## 2024-08-26 DIAGNOSIS — M54.50 CHRONIC BILATERAL LOW BACK PAIN WITHOUT SCIATICA: ICD-10-CM

## 2024-08-26 DIAGNOSIS — D09.9 SQUAMOUS CELL CARCINOMA IN SITU: ICD-10-CM

## 2024-08-26 DIAGNOSIS — M99.05 SEGMENTAL AND SOMATIC DYSFUNCTION OF PELVIC REGION: ICD-10-CM

## 2024-08-26 DIAGNOSIS — L81.4 LENTIGO: ICD-10-CM

## 2024-08-26 DIAGNOSIS — G89.29 CHRONIC BILATERAL LOW BACK PAIN WITHOUT SCIATICA: ICD-10-CM

## 2024-08-26 DIAGNOSIS — L57.8 DIFFUSE PHOTODAMAGE OF SKIN: ICD-10-CM

## 2024-08-26 PROCEDURE — 99213 OFFICE O/P EST LOW 20 MIN: CPT | Performed by: DERMATOLOGY

## 2024-08-26 PROCEDURE — 1159F MED LIST DOCD IN RCRD: CPT | Performed by: DERMATOLOGY

## 2024-08-26 PROCEDURE — 1036F TOBACCO NON-USER: CPT | Performed by: DERMATOLOGY

## 2024-08-26 PROCEDURE — 98942 CHIROPRACTIC MANJ 5 REGIONS: CPT | Performed by: CHIROPRACTOR

## 2024-08-26 PROCEDURE — 17263 DSTRJ MAL LES T/A/L 2.1-3.0: CPT | Performed by: DERMATOLOGY

## 2024-08-26 ASSESSMENT — DERMATOLOGY QUALITY OF LIFE (QOL) ASSESSMENT
RATE HOW BOTHERED YOU ARE BY SYMPTOMS OF YOUR SKIN PROBLEM (EG, ITCHING, STINGING BURNING, HURTING OR SKIN IRRITATION): 0 - NEVER BOTHERED
RATE HOW EMOTIONALLY BOTHERED YOU ARE BY YOUR SKIN PROBLEM (FOR EXAMPLE, WORRY, EMBARRASSMENT, FRUSTRATION): 2
RATE HOW BOTHERED YOU ARE BY EFFECTS OF YOUR SKIN PROBLEMS ON YOUR ACTIVITIES (EG, GOING OUT, ACCOMPLISHING WHAT YOU WANT, WORK ACTIVITIES OR YOUR RELATIONSHIPS WITH OTHERS): 2
WHAT SINGLE SKIN CONDITION LISTED BELOW IS THE PATIENT ANSWERING THE QUALITY-OF-LIFE ASSESSMENT QUESTIONS ABOUT: NONE OF THE ABOVE
WHAT SINGLE SKIN CONDITION LISTED BELOW IS THE PATIENT ANSWERING THE QUALITY-OF-LIFE ASSESSMENT QUESTIONS ABOUT: NONE OF THE ABOVE
RATE HOW BOTHERED YOU ARE BY EFFECTS OF YOUR SKIN PROBLEMS ON YOUR ACTIVITIES (EG, GOING OUT, ACCOMPLISHING WHAT YOU WANT, WORK ACTIVITIES OR YOUR RELATIONSHIPS WITH OTHERS): 2
RATE HOW BOTHERED YOU ARE BY SYMPTOMS OF YOUR SKIN PROBLEM (EG, ITCHING, STINGING BURNING, HURTING OR SKIN IRRITATION): 0 - NEVER BOTHERED
RATE HOW EMOTIONALLY BOTHERED YOU ARE BY YOUR SKIN PROBLEM (FOR EXAMPLE, WORRY, EMBARRASSMENT, FRUSTRATION): 2

## 2024-08-26 ASSESSMENT — PATIENT GLOBAL ASSESSMENT (PGA): WHAT IS THE PGA: PATIENT GLOBAL ASSESSMENT:  1 - CLEAR

## 2024-08-26 NOTE — PROGRESS NOTES
Subjective   Patient ID: Taylor Kerns is a 74 y.o. female who presents August 26, 2024 for lower back and neck pain.    MCR    Today, the patient rates their degree of pain as a 5 out of 10 on the numeric pain rating scale.     HPI : Taylor presents to my office with main complaint of lower back, right hip and neck pain. She reports that she has been dealing with right anterior hip discomfort that is worse after walking and when getting up from a seated position. She has been using heat to the area and notes overall improvement since onset. However, she continues to experience discomfort and tenderness in the hip flexor region. No radicular complaints. Denies new trauma/incident.       Objective   Physical Exam  Neurological:      General: No focal deficit present.      Mental Status: She is alert and oriented to person, place, and time.      Cranial Nerves: No dysarthria or facial asymmetry.      Sensory: Sensation is intact.      Motor: Motor function is intact.      Coordination: Coordination is intact.      Gait: Gait is intact. Gait normal.       Palpation of the following region(s) revealed:  Cervical: Upper trapezius bilateral, muscular hypertonicity.  Cervical paraspinals bilateral, muscular hypertonicity.  Thoracic: Thoracic paraspinals bilateral, muscular hypertonicity.  Middle trapezius bilateral, muscular hypertonicity.  Pectoralis bilateral, muscular hypertonicity.  Lumbar: Lumbar paraspinals right, hypertonicity and tenderness.  Quadratus lumborum right, muscular hypertonicity.  Gluteal right, hypertonicity and tenderness.  Psoas right, hypertonicity and tenderness.        Segmental Joint(s): Segmental joint dysfunction was assessed with motion palpation and is identified in the following areas:  Cervical : C2 C5  Thoracic : T3, T4, and T7  Lumbopelvic / Sacral SIJ : L4, L5/S1, R SIJ, and L SIJ      Assessment/Plan   Today's Treatment Included: Chiropractic manipulation to the Segmental Joint(s)  Cervical : C2 C5 Segmental Joint(s) Lumbopelvic/Sacral SIJ : L4, L5/S1, R SIJ, and L SIJ Segmental Joint(s) Thoracic : T3, T4, and T7   Treatment Techniques Used : Direct Non-force technique, Activator/Tool assisted technique, Pelvic blocking technique, and Low force    Supine ART release to R psoas    Soft-tissue mobilization was performed in the following areas:   Cervical paraspinal mm. bilateral and Upper Trapezius bilateral  Thoracic Paraspinal mm. bilateral, Rhomboids bilateral, and Pectoralis bilateral  Lumbar Paraspinal mm. bilateral, Quadratus Lumborum bilateral, and Psoas right    Demonstrated:  - Standing hip flexor stretch (hold 15 sec, 2x/day)    F/U in 2-3 weeks    The patient tolerated today's treatment with little or no additional discomfort and was instructed to contact the office for questions or concerns.

## 2024-08-27 NOTE — PROGRESS NOTES
Subjective     Taylor Kerns is a 74 y.o. female who presents for the following: Discuss recent biopsy results and treatment options.  Biopsy of 2 suspicious lesions performed at her last visit in our office on 7/1/24 revealed an SCC at least in situ on her left nasal dorsum and SCC in situ on her left anterior shoulder.    Today, the patient states the biopsy sites healed well.  She states she would like to discuss her treatment options today, particularly because she had a negative experience with her previous Mohs surgery due to the anxiety involved in the procedure and wonders whether she should consider having a plastic surgeon treat the skin cancer, possibly under sedation or general anesthesia, to help reduce her anxiety.  She denies any new, changing, or concerning skin lesions since her last visit; no bleeding, itching, or burning lesions.      Review of Systems:  No other skin or systemic complaints other than what is documented elsewhere in the note.    The following portions of the chart were reviewed this encounter and updated as appropriate:       Skin Cancer History  Biopsy Date Type Location Status   7/1/24 SCC in Situ Left Nasal Dorsum Refer Mohs/Surgeon   7/1/24 SCC in Situ Left Anterior Shoulder Schedule Procedure     Specialty Problems          Dermatology Problems    Actinic keratosis    Basal cell carcinoma of skin of other part of trunk    Basal cell carcinoma of skin of right upper limb, including shoulder    Melanocytic nevi of scalp and neck    Neoplasm of uncertain behavior of skin    Other hypertrophic disorders of the skin    Other melanin hyperpigmentation    Other viral warts    Skin changes due to chronic exposure to nonionizing radiation, unspecified       Past Dermatologic / Past Relevant Medical History:    - recent biopsy-proven SCC at least in situ on left nasal dorsum and SCC in situ on left anterior shoulder both diagnosed at last visit on 7/1/24 and pending definitive  treatment  - history of BCC on right nasal ala diagnosed at initial visit on 11/2/20 s/p Mohs surgery by Dr. Adam on 11/20/20  - 2 BCCs on left upper back and left thigh both diagnosed by Dr. Dickinson on 6/26/23 and both s/p ED&C by Dr. 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 (Patient taking differently: Take 0.5 tablets (0.5 mg) by mouth once daily at bedtime.), Disp: 30 tablet, Rfl: 2    minoxidil (Rogaine) 2 % external solution, Apply topically 2 times a day., Disp: 180 mL, Rfl: 1    MULTIVITAMIN ORAL, Take by mouth., Disp: , Rfl:     tretinoin (Retin-A) 0.1 % cream, Apply topically once daily at bedtime., Disp: , Rfl:     hydroquinone 4 % cream, Apply as directed Basilio Hernandez MD, Disp: , Rfl:     mirtazapine (Remeron) 7.5 mg tablet, Take 1 tablet (7.5 mg) by mouth once daily at bedtime. (Patient not taking: Reported on 8/26/2024), Disp: 30 tablet, Rfl: 1     Objective   Well appearing patient in no apparent distress; mood and affect are within normal limits.    A skin  examination was performed including: Face, neck, and bilateral upper extremities. All findings within normal limits unless otherwise noted below.    Assessment/Plan   1. Squamous cell carcinoma of skin  Nose  Pink, well-healed scar at her recent biopsy site    Biopsy-proven squamous cell carcinoma at least in situ - left nasal dorsum; diagnosed at last visit on 7/1/24 and pending definitive treatment.  The malignant nature of SCC, the need for further definitive treatment, and treatment options, including Mohs surgery, wide local excision, and Electrodesiccation & Curettage, were discussed extensively with the patient today.  After discussing the risks and benefits of each option, including the differences in cure rates and permanent scar results, the patient expressed understanding, and I again recommended referral to our Dermatologic surgery unit for definitive treatment of this SCC, like with Mohs surgery.  The patient expressed understanding, is in agreement with this plan, and states she already scheduled her Mohs surgery to be performed in our office by Dr. Gallagher on 9/26/24.    2. 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  Pink, well-healed scar at her recent biopsy site    Destr of lesion    Destruction method: electrodesiccation and curettage    Informed consent: discussed and consent obtained    Timeout:  patient name, date of birth, surgical site, and procedure verified  Procedure prep:  Patient was prepped and draped  Anesthesia: the lesion was anesthetized in a standard fashion    Anesthetic:  1% lidocaine w/ epinephrine 1-100,000 local infiltration  Curettage performed in three different directions: Yes    Electrodesiccation performed over the curetted area: Yes    Curettage cycles:  3  Lesion length (cm):  1.2  Lesion width (cm):  1.2  Margin per side (cm):  0.6  Final wound size (cm):  2.4  Hemostasis achieved with:  electrodesiccation  Outcome: patient tolerated procedure well with  no complications    Post-procedure details: sterile dressing applied and wound care instructions given    Dressing type: bandage and petrolatum      Staff Communication: Dermatology Local Anesthesia: 1 % Lidocaine / Epinephrine - Amount:1ml    Biopsy-proven squamous cell carcinoma in situ - left anterior shoulder; diagnosed at last visit on 7/1/24 and pending definitive treatment.  The malignant nature of SCC in situ, the need for further definitive treatment, and treatment options, including Mohs surgery, wide local excision, and Electrodesiccation & Curettage, were discussed extensively with the patient today.  After discussing the risks and benefits of each option, including the differences in cure rates and permanent scar results, the patient expressed understanding and wishes to proceed with definitive treatment with ED&C today.  I will have the patient return to our office in 3 months for re-evaluation of the ED&C site as well as routine follow-up and skin exam, and the patient was instructed to call should they notice any new, changing, symptomatic, or concerning skin lesions before then.  The patient expressed understanding, is in agreement with this plan, and wishes to proceed with the ED&C today.    3. Seborrheic keratosis  Right Forearm - Anterior  Scattered on the patient's face, neck, and bilateral upper extremities, there are multiple tan- to light brown-colored, hyperkeratotic, stuck-on appearing papules of varying size and shape    Seborrheic Keratoses - the benign nature of these lesions was discussed with the patient today and reassurance provided.  No treatment is medically indicated for these lesions at this time.    4. Lentigo  Photodistributed  Multiple tan- to light brown-colored, round- to oval-shaped, symmetric and uniform-appearing macules and small patches consistent with lentigines scattered in sun-exposed areas.    Solar Lentigines and photodamage.  The clinically benign-appearing nature  of these lesions and their relation to chronic sun exposure were discussed with the patient today and reassurance provided.  None of these lesions meet threshold for biopsy today, and thus no treatment is medically indicated for these lesions at this time.  The signs and symptoms of skin cancer were reviewed and the patient was advised to practice sun protection and sun avoidance, use daily sunscreen, and perform regular self skin exams.  The patient was instructed to monitor these lesions for any changes, such as in size, shape, or color, or associated symptoms and to call our office to schedule a return visit for re-evaluation if any such changes or symptoms are noticed in the future.  The patient expressed understanding and is in agreement with this plan.    5. Diffuse photodamage of skin  Photodistributed  Diffuse photodamage with actinic changes with telangiectasia and mottled pigmentation in sun-exposed areas.    History of nonmelanoma skin cancers, dysplastic nevus, and actinic keratoses and photodamage.  The patient was recently seen in our office for routine follow-up and skin exam on 7/1/24, at which time no evidence of recurrence was noted.  The signs and symptoms of skin cancer were reviewed and the patient was advised to practice sun protection and sun avoidance, use daily sunscreen, and perform regular self skin exams. I will have the patient return to our office in 4-6 months from the date of her last visit for routine follow-up and skin exam, and the patient was instructed to call our office should the patient notice any new, changing, symptomatic, or otherwise concerning skin lesions before then. The patient expressed understanding and is in agreement with this plan.

## 2024-09-09 ENCOUNTER — APPOINTMENT (OUTPATIENT)
Dept: INTEGRATIVE MEDICINE | Facility: CLINIC | Age: 74
End: 2024-09-09
Payer: MEDICARE

## 2024-09-09 ENCOUNTER — TELEPHONE (OUTPATIENT)
Dept: DERMATOLOGY | Facility: CLINIC | Age: 74
End: 2024-09-09

## 2024-09-09 DIAGNOSIS — G89.29 CHRONIC BILATERAL LOW BACK PAIN WITHOUT SCIATICA: ICD-10-CM

## 2024-09-09 DIAGNOSIS — M79.18 MYALGIA, OTHER SITE: ICD-10-CM

## 2024-09-09 DIAGNOSIS — M99.03 SEGMENTAL AND SOMATIC DYSFUNCTION OF LUMBAR REGION: Primary | ICD-10-CM

## 2024-09-09 DIAGNOSIS — M54.2 CERVICALGIA: ICD-10-CM

## 2024-09-09 DIAGNOSIS — M99.05 SEGMENTAL AND SOMATIC DYSFUNCTION OF PELVIC REGION: ICD-10-CM

## 2024-09-09 DIAGNOSIS — M99.01 SEGMENTAL DYSFUNCTION OF CERVICAL REGION: ICD-10-CM

## 2024-09-09 DIAGNOSIS — M54.50 CHRONIC BILATERAL LOW BACK PAIN WITHOUT SCIATICA: ICD-10-CM

## 2024-09-09 PROCEDURE — 98941 CHIROPRACT MANJ 3-4 REGIONS: CPT | Performed by: CHIROPRACTOR

## 2024-09-09 NOTE — PROGRESS NOTES
Subjective   Patient ID: Taylor Kerns is a 74 y.o. female who presents September 9, 2024 for lower back and neck pain.    MCR    Today, the patient rates their degree of pain as a 6 out of 10 on the numeric pain rating scale.     HPI : Taylor presents to my office with main complaint of lower back and neck pain. She denies inciting trauma/incident but reports increased discomfort in both the neck and lower back today. She continues with Pilates and Yoga about 1x/week each with benefit. She is scheduled for a massage later today. No radicular complaints. Denies new trauma/incident.       Objective   Physical Exam  Neurological:      General: No focal deficit present.      Mental Status: She is alert and oriented to person, place, and time.      Cranial Nerves: No dysarthria or facial asymmetry.      Sensory: Sensation is intact.      Motor: Motor function is intact.      Coordination: Coordination is intact.      Gait: Gait is intact. Gait normal.       Palpation of the following region(s) revealed:  Cervical: Upper trapezius right, muscular hypertonicity.  Cervical paraspinals right, hypertonicity and tenderness.  Thoracic: Thoracic paraspinals right, muscular hypertonicity.  Middle trapezius right, muscular hypertonicity.  Pectoralis right, muscular hypertonicity.  Lumbar: Lumbar paraspinals right, hypertonicity and tenderness.  Quadratus lumborum right, muscular hypertonicity.  Gluteal right, hypertonicity and tenderness.  Psoas right, hypertonicity and tenderness.        Segmental Joint(s): Segmental joint dysfunction was assessed with motion palpation and is identified in the following areas:  Cervical : C4 C5  Thoracic : T3, T4, T7, and T8  Lumbopelvic / Sacral SIJ : L4, L5/S1, R SIJ, and L SIJ      Assessment/Plan   Today's Treatment Included: Chiropractic manipulation to the Segmental Joint(s) Cervical : C4 C5 Segmental Joint(s) Lumbopelvic/Sacral SIJ : L4, L5/S1, R SIJ, and L SIJ Segmental Joint(s)  Thoracic : T3, T4, T7, and T8   Treatment Techniques Used : Direct Non-force technique, Activator/Tool assisted technique, Pelvic blocking technique, and Low force    Soft-tissue mobilization was performed in the following areas:   Cervical paraspinal mm. bilateral and Upper Trapezius bilateral  Thoracic Paraspinal mm. bilateral, Rhomboids bilateral, and Pectoralis bilateral  Lumbar Paraspinal mm. bilateral, Quadratus Lumborum bilateral, and Psoas right    F/U in 2-3 weeks    The patient tolerated today's treatment with little or no additional discomfort and was instructed to contact the office for questions or concerns.

## 2024-09-14 ENCOUNTER — PATIENT MESSAGE (OUTPATIENT)
Dept: PRIMARY CARE | Facility: CLINIC | Age: 74
End: 2024-09-14
Payer: MEDICARE

## 2024-09-18 ENCOUNTER — TELEMEDICINE (OUTPATIENT)
Dept: PRIMARY CARE | Facility: CLINIC | Age: 74
End: 2024-09-18
Payer: MEDICARE

## 2024-09-18 DIAGNOSIS — L65.8 FEMALE PATTERN HAIR LOSS: Primary | ICD-10-CM

## 2024-09-18 PROCEDURE — 99442 PR PHYS/QHP TELEPHONE EVALUATION 11-20 MIN: CPT | Performed by: STUDENT IN AN ORGANIZED HEALTH CARE EDUCATION/TRAINING PROGRAM

## 2024-09-18 RX ORDER — SPIRONOLACTONE 50 MG/1
50 TABLET, FILM COATED ORAL DAILY
Qty: 90 TABLET | Refills: 1 | Status: SHIPPED | OUTPATIENT
Start: 2024-09-18 | End: 2025-03-17

## 2024-09-18 NOTE — PROGRESS NOTES
Taylor Kerns is a 74 y.o. female [place] [facility] [place] Noble Llanos on 09/18/24 for routine care, as well as for management of the following chronic medical conditions: TELMA, insomnia, DLD, subclinical hypothyroidism, BCC, SCC, endometrial cancer s/p NATALIYA, BSO, knee pain. Patient presents today to discuss hair loss via telephone visit.     Notes trying topical Minoxidil without any significant improvement and in fact with worsening hair loss/shedding. Discussed this is a natural course/side effect often seen in first few weeks/months of starting the medication. Can continue with the topical Minoxidil and/or add Aldactone for improved response.    If chooses to pursue oral Aldactone, would advise follow up lab assessment to check renal function/electrolytes/potassium. Recent labs from 08/2024 reviewed, and at baseline, reassuring to start. Recent TSH at goal.     If would not like to pursue either above recommendation, may consider dermatology referral for additional guidance.     CHRONIC MEDICAL ISSUES (Not discussed during today's visit):     #TELMA, Insomnia  - previously trialed on sertraline, fluoxetine, wellbutrin in the past without efficacy and with intolerances like increased anxiety or activation   - follows with psychiatry, Dr. Mcgrath at [facility]  - current regimen: Abilify 2mg, Buspirone 2.5mg BID, Mirtazapine 7.5mg at bedtime, Lorazepam 0.25mg QID PRN      #DLD  - ASCVD 8.6%, CAC score 0 9/2022  - lipid panel total cholesterol 243 <- 283,  <- 184 8/2024, both improved   - qualifies for statin, but declines   - ctm      # Endometrial ca s/p NATALIYA BSO  - follows with gyn/onc (Dr. Jackman at [facility])     #BCC  #SCC  - follows with dermatology at [facility]   - Mohs surgery for SCC on nose 9/27, SCC in L arm as well     #External hemorrhoids, internal hemorrhoids s/p banding   - banding completed in [place] 2/2023 during C-scope at [facility] (Dr. Huerta  Tim)  - no blood in stools, constipation, abdominal pain   - Hb stable, 14 8/2024  [ ] advised to call hospital and get C-scope records    #IBS  - no issues with abdominal pain, diarrhea, constipation  - off lubipristone now    #Knee pain s/p MRI (.22 complex meniscal tear and cartilage loss  -stable     #Interval hair loss  -recent screening labs 8/2024 wnl   - as above      #Subclinical hypothyroidism, resolved  - TSH 3.88 8/2024, previous TSH values were elevated    Subspecialty Medical Care: psychiatry, dermatology, gyn/onc    Past Surgical History:   Past Surgical History:   Procedure Laterality Date    OTHER SURGICAL HISTORY  10/16/2019    Hysterectomy robotic     Medications:   Current Outpatient Medications:     ARIPiprazole (Abilify) 2 mg tablet, Take 1 tablet (2 mg) by mouth once daily., Disp: 90 tablet, Rfl: 1    busPIRone (Buspar) 5 mg tablet, Take 1 tablet (5 mg) by mouth 2 times a day., Disp: 180 tablet, Rfl: 0    hydroquinone 4 % cream, Apply as directed Basilio Hernandez MD, Disp: , Rfl:     LORazepam (Ativan) 1 mg tablet, Take 0.5 tablets (0.5 mg) by mouth 2 times a day as needed for anxiety. (Patient taking differently: Take 0.5 tablets (0.5 mg) by mouth once daily at bedtime.), Disp: 30 tablet, Rfl: 2    minoxidil (Rogaine) 2 % external solution, Apply topically 2 times a day., Disp: 180 mL, Rfl: 1    mirtazapine (Remeron) 7.5 mg tablet, Take 1 tablet (7.5 mg) by mouth once daily at bedtime. (Patient not taking: Reported on 8/26/2024), Disp: 30 tablet, Rfl: 1    MULTIVITAMIN ORAL, Take by mouth., Disp: , Rfl:     tretinoin (Retin-A) 0.1 % cream, Apply topically once daily at bedtime., Disp: , Rfl:   Pharmacy: [facility] in [place]    Allergies:   Allergies   Allergen Reactions    Amoxicillin Diarrhea and Hives     Immunizations: as below    Family History:   Grandfather - bone cancer   Maternal grandmother - heart disease, diverticulosis  Father - Lewy Body dementia  Paternal grandmother -  Alzheimer's  Family History   Problem Relation Name Age of Onset    Suicidality Paternal Grandfather       Social History:   Home/Living Situation/Falls/Safety Assessment: lives in [place] 8 months of the year and [place] for the summer  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     PHYSICAL EXAM (see below for reference):   General: well appearing  female, NAD, pleasant and engaged in encounter    HEENT: NCAT, MMM, SCC on L nose  CV: RRR, no m/r/g  PULM: CTAB, non-labored respirations   ABD: soft, NT, ND, + bowel sounds   : no suprapubic or CVA tenderness   EXT: WWP, no significant edema   SKIN: SCC on L shoulder  NEURO: A&Ox4, symmetric facies, no gross motor or sensory deficits, normal gait  PSYCH: pleasant mood, appropriate affect     Assessment/Plan    Taylor Kerns is a 74 y.o. female seen in Clinic at [facility] by Dr. Noble Llanos on 09/18/2024 for routine care, as well as for management of the following chronic medical conditions: TELMA, insomnia, DLD, subclinical hypothyroidism, BCC, SCC, endometrial cancer s/p NATALIYA, BSO, knee pain. Patient presents today to discuss hair loss via telephone visit.     Notes trying topical Minoxidil without any significant improvement and in fact with worsening hair loss/shedding. Discussed this is a natural course/side effect often seen in first few weeks/months of starting the medication. Can continue with  the topical Minoxidil and/or add Aldactone for improved response.    If chooses to pursue oral Aldactone, would advise follow up lab assessment to check renal function/electrolytes/potassium. Recent labs from 08/2024 reviewed, and at baseline, reassuring to start. Recent TSH at goal.     If would not like to pursue either above recommendation, may consider dermatology referral for additional guidance.     CHRONIC MEDICAL ISSUES (Not discussed during today's visit):     #TELMA, Insomnia  - previously trialed on sertraline, fluoxetine, wellbutrin in the past without efficacy and with intolerances like increased anxiety or activation   - follows with psychiatry, Dr. Mcgrath at [facility]  - current regimen: Abilify 2mg, Buspirone 2.5mg BID, Mirtazapine 7.5mg at bedtime, Lorazepam 0.25mg QID PRN      #DLD  - ASCVD 8.6%, CAC score 0 9/2022  - lipid panel total cholesterol 243 <- 283,  <- 184 8/2024, both improved   - qualifies for statin, but declines   - ctm      # Endometrial ca s/p NATALIYA BSO  - follows with gyn/onc (Dr. Jackman at [facility])     #BCC  #SCC  - follows with dermatology at [facility]   - Mohs surgery for SCC on nose 9/27, SCC in L arm as well     #External hemorrhoids, internal hemorrhoids s/p banding   - banding completed in [place] 2/2023 during C-scope at [facility] (Dr. Bradley Dumont)  - no blood in stools, constipation, abdominal pain   - Hb stable, 14 8/2024  [ ] advised to call hospital and get C-scope records    #IBS  - no issues with abdominal pain, diarrhea, constipation  - off lubipristone now    #Knee pain s/p MRI (.22 complex meniscal tear and cartilage loss  -stable     #Interval hair loss  -recent screening labs 8/2024 wnl   - as above      #Subclinical hypothyroidism, resolved  - TSH 3.88 8/2024, previous TSH values were elevated    #Health Maintenance  Cancer Screening  - Cervical Cancer Screening: s/p NATALIYA/BSO  - Mammography: due 10/2024, ordered  - Colorectal  Cancer Screening: will obtain C-scope records from [facility] in [place]  - Lung Cancer Screening: quit smoking > 50 years ago, not eligible    Laboratory Screening  - Lipid Screen: , ,  8/2024  - ASCVD Score: 8.6%, qualifies for statin however declines based on CAC 0 in 2022  - A1C, glucose screen: A1C 5.8 8/2024  - STI, HIV, Hep B screen: defer  - Hep C screen: defer    Imaging Screening  - Osteoporosis/DEXA screening: ordered and pending 09/2024    Immunizations:   - Influenza: recommended annually   - COVID: UTD on boosters, due this year  - RSV: advised to consider   - Tdap: completed 2016, due 2026  - Prevnar, Pneumovax: completed (last 2022)   - Shingrix: due, shingrix advised (prior Zostavax in 2013)    Other Screening  - Health Literacy Assessment: good  - Depression screen: as above  - Home safety/partner violence screen: negative  - Hearing/Vision screens: negative  - Alcohol/tobacco/drug use screen: negative  - Healthcare POA/Advanced Directives: son     Return to clinic in 6 months for follow-up.    Patient Discussion:    Please call back the office with any questions at [phone]. In the case of an emergency, please call 911 or go to the nearest Emergency Department.      Noble Llanos MD  Internal Medicine-Pediatrics  [facility] [address], [address]  P: [phone], F: [phone]

## 2024-09-20 ENCOUNTER — APPOINTMENT (OUTPATIENT)
Dept: BEHAVIORAL HEALTH | Facility: CLINIC | Age: 74
End: 2024-09-20
Payer: MEDICARE

## 2024-09-20 DIAGNOSIS — F33.41 RECURRENT MAJOR DEPRESSIVE DISORDER, IN PARTIAL REMISSION (CMS-HCC): ICD-10-CM

## 2024-09-20 DIAGNOSIS — F41.9 ANXIETY: ICD-10-CM

## 2024-09-20 PROCEDURE — 99213 OFFICE O/P EST LOW 20 MIN: CPT | Performed by: PSYCHIATRY & NEUROLOGY

## 2024-09-20 RX ORDER — ARIPIPRAZOLE 2 MG/1
2 TABLET ORAL DAILY
Qty: 90 TABLET | Refills: 0 | Status: SHIPPED | OUTPATIENT
Start: 2024-09-20 | End: 2024-12-19

## 2024-09-20 RX ORDER — MIRTAZAPINE 7.5 MG/1
7.5 TABLET, FILM COATED ORAL NIGHTLY
Qty: 30 TABLET | Refills: 1 | Status: SHIPPED | OUTPATIENT
Start: 2024-09-20 | End: 2024-11-19

## 2024-09-20 RX ORDER — BUSPIRONE HYDROCHLORIDE 5 MG/1
5 TABLET ORAL 2 TIMES DAILY
Qty: 180 TABLET | Refills: 0 | Status: SHIPPED | OUTPATIENT
Start: 2024-09-20 | End: 2024-12-19

## 2024-09-20 RX ORDER — LORAZEPAM 1 MG/1
0.5 TABLET ORAL 2 TIMES DAILY PRN
Qty: 30 TABLET | Refills: 2 | Status: SHIPPED | OUTPATIENT
Start: 2024-09-20 | End: 2024-12-19

## 2024-09-20 ASSESSMENT — ENCOUNTER SYMPTOMS
DYSPHORIC MOOD: 0
NERVOUS/ANXIOUS: 1

## 2024-09-23 ENCOUNTER — APPOINTMENT (OUTPATIENT)
Dept: RADIOLOGY | Facility: HOSPITAL | Age: 74
End: 2024-09-23
Payer: MEDICARE

## 2024-09-23 ENCOUNTER — APPOINTMENT (OUTPATIENT)
Dept: INTEGRATIVE MEDICINE | Facility: CLINIC | Age: 74
End: 2024-09-23
Payer: MEDICARE

## 2024-09-23 DIAGNOSIS — M54.50 CHRONIC BILATERAL LOW BACK PAIN WITHOUT SCIATICA: ICD-10-CM

## 2024-09-23 DIAGNOSIS — G89.29 CHRONIC BILATERAL LOW BACK PAIN WITHOUT SCIATICA: ICD-10-CM

## 2024-09-23 DIAGNOSIS — M99.01 SEGMENTAL DYSFUNCTION OF CERVICAL REGION: ICD-10-CM

## 2024-09-23 DIAGNOSIS — M54.2 CERVICALGIA: ICD-10-CM

## 2024-09-23 DIAGNOSIS — M99.05 SEGMENTAL AND SOMATIC DYSFUNCTION OF PELVIC REGION: ICD-10-CM

## 2024-09-23 DIAGNOSIS — M99.03 SEGMENTAL AND SOMATIC DYSFUNCTION OF LUMBAR REGION: Primary | ICD-10-CM

## 2024-09-23 DIAGNOSIS — M79.18 MYALGIA, OTHER SITE: ICD-10-CM

## 2024-09-23 PROCEDURE — 98941 CHIROPRACT MANJ 3-4 REGIONS: CPT | Performed by: CHIROPRACTOR

## 2024-09-23 NOTE — PROGRESS NOTES
Subjective   Patient ID: Taylor Kerns is a 74 y.o. female who presents September 23, 2024 for lower back and neck pain.    MCR    Today, the patient rates their degree of pain as a 7 out of 10 on the numeric pain rating scale.     HPI : Taylor presents to my office with main complaint of lower back and neck pain. She reports soreness throughout the body, but particularly in the low back today. States she woke up quite stiff and sore and stretching this morning has helped mildly. She has also been feeling intermittent discomfort in the left anterior hip. She has continued with Pilates 1-2x/week and will be returning to the pool today. Denies new trauma/incident.       Objective   Physical Exam  Neurological:      General: No focal deficit present.      Mental Status: She is alert and oriented to person, place, and time.      Cranial Nerves: No dysarthria or facial asymmetry.      Sensory: Sensation is intact.      Motor: Motor function is intact.      Coordination: Coordination is intact.      Gait: Gait is intact. Gait normal.       Palpation of the following region(s) revealed:  Cervical: Upper trapezius right, muscular hypertonicity.  Cervical paraspinals right, hypertonicity and tenderness.  Thoracic: Thoracic paraspinals right, muscular hypertonicity.  Middle trapezius right, muscular hypertonicity.  Pectoralis right, muscular hypertonicity.  Lumbar: Lumbar paraspinals bilateral, hypertonicity and tenderness.  Quadratus lumborum bilateral, hypertonicity and tenderness and muscular hypertonicity.  Gluteal bilateral, hypertonicity and tenderness.  Psoas left, hypertonicity and tenderness.        Segmental Joint(s): Segmental joint dysfunction was assessed with motion palpation and is identified in the following areas:  Cervical : C4 C5  Thoracic : T3, T4, T7, and T8  Lumbopelvic / Sacral SIJ : L4, L5/S1, R SIJ, and L SIJ      Assessment/Plan   Today's Treatment Included: Chiropractic manipulation to the  Segmental Joint(s) Cervical : C4 C5 Segmental Joint(s) Lumbopelvic/Sacral SIJ : L4, L5/S1, R SIJ, and L SIJ Segmental Joint(s) Thoracic : T3, T4, T7, and T8   Treatment Techniques Used : Direct Non-force technique, Activator/Tool assisted technique, Pelvic blocking technique, and Low force    Supine left adductors, psoas release supine    Soft-tissue mobilization was performed in the following areas:   Cervical paraspinal mm. bilateral and Upper Trapezius bilateral  Thoracic Paraspinal mm. bilateral, Rhomboids bilateral, and Pectoralis bilateral  Lumbar Paraspinal mm. bilateral, Quadratus Lumborum bilateral, Gluteal mm. Glute. Med. bilateral, and Psoas bilateral    F/U in 2-3 weeks    The patient tolerated today's treatment with little or no additional discomfort and was instructed to contact the office for questions or concerns.

## 2024-09-24 ENCOUNTER — HOSPITAL ENCOUNTER (OUTPATIENT)
Dept: RADIOLOGY | Facility: HOSPITAL | Age: 74
Discharge: HOME | End: 2024-09-24
Payer: MEDICARE

## 2024-09-24 DIAGNOSIS — Z78.0 POSTMENOPAUSAL: ICD-10-CM

## 2024-09-24 PROCEDURE — 77080 DXA BONE DENSITY AXIAL: CPT | Performed by: RADIOLOGY

## 2024-09-24 PROCEDURE — 77080 DXA BONE DENSITY AXIAL: CPT

## 2024-09-26 ENCOUNTER — APPOINTMENT (OUTPATIENT)
Dept: DERMATOLOGY | Facility: CLINIC | Age: 74
End: 2024-09-26
Payer: MEDICARE

## 2024-09-26 ENCOUNTER — PATIENT MESSAGE (OUTPATIENT)
Dept: BEHAVIORAL HEALTH | Facility: CLINIC | Age: 74
End: 2024-09-26

## 2024-09-26 VITALS — DIASTOLIC BLOOD PRESSURE: 90 MMHG | SYSTOLIC BLOOD PRESSURE: 144 MMHG | HEART RATE: 67 BPM

## 2024-09-26 DIAGNOSIS — C44.321 SQUAMOUS CELL CANCER OF SKIN OF NOSE: ICD-10-CM

## 2024-09-26 DIAGNOSIS — F33.41 RECURRENT MAJOR DEPRESSIVE DISORDER, IN PARTIAL REMISSION (CMS-HCC): ICD-10-CM

## 2024-09-26 PROCEDURE — 99214 OFFICE O/P EST MOD 30 MIN: CPT | Performed by: DERMATOLOGY

## 2024-09-26 PROCEDURE — 17311 MOHS 1 STAGE H/N/HF/G: CPT | Performed by: DERMATOLOGY

## 2024-09-26 PROCEDURE — 17312 MOHS ADDL STAGE: CPT | Performed by: DERMATOLOGY

## 2024-09-26 NOTE — PROGRESS NOTES
Mohs Surgery Operative Note    Date of Surgery:  9/26/2024  Surgeon:  Mj Gallagher MD  Office Location: [address] [address]  Dept: [phone]  Dept Fax: [phone]  Referring Provider: Alexey Nuno MD  [address] [place] [address] Dr Lesia Palumboands, [address] [address]      Assessment/Plan   Pre-procedure:   Obtained informed consent: written from patient  The surgical site was identified and confirmed with the patient.     Intra-operative:   Audible time out called at : 10:15 AM 09/26/24  by: Breana Lyons MA   Verified patient name, birthdate, site, specimen bottle label & requisition.    The planned procedure(s) was again reviewed with the patient. The risks of bleeding, infection, nerve damage and scarring were reviewed. Written authorization was obtained. The patient identity, surgical site, and planned procedure(s) were verified. The provider acted as both surgeon and pathologist.     Squamous cell cancer of skin of nose  Dorsum of Nose    Mohs surgery    Consent obtained: written    Universal Protocol:  Procedure explained and questions answered to patient or proxy's satisfaction: Yes    Test results available and properly labeled: Yes    Pathology report reviewed: Yes    External notes reviewed: Yes    Photo or diagram used for site identification: Yes    Site/side marked: Yes    Slide independently reviewed by Mohs surgeon: Yes    Immediately prior to procedure a time out was called: Yes    Patient identity confirmed: verbally with patient  Preparation: Patient was prepped and draped in usual sterile fashion      Anticoagulation:  Is the patient taking prescription anticoagulant and/or aspirin prescribed/recommended by a physician? No    Was the anticoagulation regimen changed prior to Mohs? No      Anesthesia:  Anesthesia method: local infiltration  Local anesthetic: lidocaine 1% WITH epi    Procedure Details:  Case ID Number:  [id number]  Biopsy accession number: [id number]  Date of biopsy: 7/1/2024  Pre-Op diagnosis: squamous cell carcinoma  SCC subtype: in situ  Surgical site (from skin exam): Dorsum of Nose  Pre-operative length (cm): 0.5  Pre-operative width (cm): 0.5  Indications for Mohs surgery: anatomic location where tissue conservation is critical  Previously treated? No      Micrographic Surgery Details:  Post-operative length (cm): 0.7  Post-operative width (cm): 0.6  Number of Mohs stages: 2    Stage 1     Comments: The patient was brought into the operating room and placed in the procedure chair in the appropriate position.  The area positive by previous biopsy was identified and confirmed with the patient. The area of clinically obvious tumor was debulked using a curette and/or scalpel as needed. An incision was made following the Mohs approach through the skin. The specimen was taken to the lab, divided into 2 piece(s) and appropriately chromacoded and processed.      Tumor was seen on the lateral margins as indicated on the on the Mohs map.  Squamous cell carcinoma in situ. Histologic examination revealed enlarged, atypical keratinocytes with large nuclear to cytoplasmic ratio extending throughout the full thickness of an epidermis.     Stage 2     Comments: The area of positivity as noted on the Mohs map in the previous stage was identified and removed using the Mohs technique. The specimen was taken to the lab and appropriately chromacoded and processed in 1 piece(s).     Tumor features identified on Mohs section: no tumor identified    Depth of defect: subcutaneous fat    Patient tolerance of procedure: tolerated well, no immediate complications    Reconstruction:  Was the defect reconstructed?: No     Repair: After a discussion with the patient regarding the options for wound closure, a decision was made to proceed with second intention healing.    Dressing/Follow-up: Surgifoam was placed in the wound. A pressure  dressing was placed to help stabilize the wound and to minimize the risk of postoperative bleeding. Wound care was discussed, and the patient was given written post-operative wound care instructions.    Hemostasis achieved with: Gelfoam  Outcome: patient tolerated procedure with difficulty    Post-procedure details: sterile dressing applied    Dressing type: pressure dressing      Repair: After a discussion with the patient regarding the options for wound closure, a decision was made to proceed with second intention healing.  Dressing F/U: Surgifoam was placed in the wound. A pressure dressing was placed to help stabilize the wound and to minimize the risk of postoperative bleeding. Wound care was discussed, and the patient was given written post-operative wound care instructions.       The final repair measured 0.7 x 0.6 cm            Wound care was discussed, and the patient was given written post-operative wound care instructions.      The patient will follow up with Mj Gallagher MD as needed for any post operative problems or concerns, and will follow up with their primary dermatologist as scheduled.

## 2024-09-26 NOTE — PROGRESS NOTES
Office Visit Note  Date: 9/26/2024  Surgeon:  Mj Gallagher MD  Office Location: [address]   [facility] [address]  [facility] [address]  Dept: [phone]  Dept Fax: [phone]  Referring Provider: Alexey Nuno MD  [address] [place] [address]   [facility] [address]  [place],  [facility]    Subjective   Taylor Kerns is a 74 y.o. female who presents for the following: MOHS Surgery    According to the patient, the lesion has been present for approximately 6 months at the time of diagnosis.  The lesion is not causing symptoms.  The lesion has not been treated previously.    The patient does not have a pacemaker / defibrillator.  The patient does not have a heart valve / joint replacement.    The patient is not on blood thinners.  The patient does not have a history of hepatitis B or C.  The patient does not have a history of HIV.  The patient does not have a history of immunosuppression (e.g. organ transplantation, malignancy, medications)    Review of Systems:  No other skin or systemic complaints other than what is documented elsewhere in the note.    MEDICAL HISTORY: clinically relevant history including significant past medical history, medications and allergies was reviewed and documented in Epic.    Objective   Well appearing patient in no apparent distress; mood and affect are within normal limits.  Vital signs: See record.  Noted on the Dorsum of Nose  Is a 0.5 x 0.5 cm scar        The patient confirmed the identified site.    Discussion:  The nature of the diagnosis was explained. The lesion is a skin cancer.  It has a risk of local growth and distant spread. The condition is associated with sun exposure.  Warning signs of non-melanoma skin cancer discussed. Patient was instructed to perform monthly self skin examination.  We recommended that the patient have regular full skin exams given an increased risk of subsequent skin cancers. The patient was instructed to use  sun protective behaviors including use of broad spectrum sunscreens and sun protective clothing to reduce risk of skin cancers.      Risks, benefits, side effects of Mohs surgery were discussed with patient and the patient voiced understanding.  It was explained that even though the cure rate of Mohs is very high it is not 100%. Risks of surgery including but not limited to bleeding, infection, numbness, nerve damage, and scar were reviewed.  Discussion included wound care requirements, activity restrictions, likely scar outcome and time to heal.     After Mohs surgery, the defect may need to be repaired surgically and the scar may be longer than the original lesion.  Reconstruction options, risks, and benefits were reviewed including second intention healing, linear repair (4-1 ratio was explained), local flaps, skin grafts, cartilage grafts and interpolation flaps (the need for multiple surgeries was explained). Possible outcomes were reviewed including likely scar appearance, failure of flap survival, infection, bleeding and the need for revision surgery.     The pathology was reviewed, the photograph was reviewed, and the referring physician's note was reviewed.    Patient elected for Mohs surgery.    The patient has a squamous cell carcinoma.  The pathology was reviewed, the photograph was reviewed, and the referring physicians note was reviewed.  Multiple treatment options including mohs surgery (which has moderate risk of morbidity) were reviewed.    Medical Decision Making  Column 1- Squamous Cell Carcinoma (1 acute uncomplicated illness- Low)  Column 2- 3 tests reviewed (pathology, photograph, referring physician notes- Moderate)  Column 3- Modertate risk of morbidity from additional treatment- mohs surgry- Moderate)    Overall Moderate MDM

## 2024-09-27 DIAGNOSIS — F33.41 RECURRENT MAJOR DEPRESSIVE DISORDER, IN PARTIAL REMISSION (CMS-HCC): ICD-10-CM

## 2024-09-27 RX ORDER — MIRTAZAPINE 7.5 MG/1
7.5 TABLET, FILM COATED ORAL NIGHTLY
Qty: 30 TABLET | Refills: 1 | Status: SHIPPED | OUTPATIENT
Start: 2024-09-27 | End: 2024-11-26

## 2024-09-27 RX ORDER — MIRTAZAPINE 7.5 MG/1
7.5 TABLET, FILM COATED ORAL NIGHTLY
Qty: 30 TABLET | Refills: 1 | OUTPATIENT
Start: 2024-09-27

## 2024-10-01 ENCOUNTER — APPOINTMENT (OUTPATIENT)
Dept: INTEGRATIVE MEDICINE | Facility: CLINIC | Age: 74
End: 2024-10-01
Payer: MEDICARE

## 2024-10-01 DIAGNOSIS — M99.04 SEGMENTAL AND SOMATIC DYSFUNCTION OF SACRAL REGION: ICD-10-CM

## 2024-10-01 DIAGNOSIS — M99.02 SEGMENTAL AND SOMATIC DYSFUNCTION OF THORACIC REGION: ICD-10-CM

## 2024-10-01 DIAGNOSIS — M99.05 SEGMENTAL AND SOMATIC DYSFUNCTION OF PELVIC REGION: ICD-10-CM

## 2024-10-01 DIAGNOSIS — M99.03 SEGMENTAL AND SOMATIC DYSFUNCTION OF LUMBAR REGION: Primary | ICD-10-CM

## 2024-10-01 DIAGNOSIS — G89.29 CHRONIC BILATERAL LOW BACK PAIN WITHOUT SCIATICA: ICD-10-CM

## 2024-10-01 DIAGNOSIS — M54.2 CERVICALGIA: ICD-10-CM

## 2024-10-01 DIAGNOSIS — M99.01 SEGMENTAL DYSFUNCTION OF CERVICAL REGION: ICD-10-CM

## 2024-10-01 DIAGNOSIS — M54.50 CHRONIC BILATERAL LOW BACK PAIN WITHOUT SCIATICA: ICD-10-CM

## 2024-10-01 PROCEDURE — 98942 CHIROPRACTIC MANJ 5 REGIONS: CPT | Performed by: CHIROPRACTOR

## 2024-10-01 NOTE — PROGRESS NOTES
Subjective   Patient ID: Taylor Kerns is a 74 y.o. female who presents October 1, 2024 for lower back and neck pain.    MCR    Today, the patient rates their degree of pain as a 7 out of 10 on the numeric pain rating scale.     HPI : Taylor presents to my office with main complaint of lower back and neck pain. She has been noticing lower back discomfort and bilateral anterior hip tension and achy pain. She notes the hip pain when walking long distances. Reports doing intermittent stretching for the hip flexors with mild relief. No radicular symptoms reported. Scheduled to do Pilates today and is typically able to do this without exacerbation. Denies new trauma/incident.       Objective   Physical Exam  Neurological:      General: No focal deficit present.      Mental Status: She is alert and oriented to person, place, and time.      Cranial Nerves: No dysarthria or facial asymmetry.      Sensory: Sensation is intact.      Motor: Motor function is intact.      Coordination: Coordination is intact.      Gait: Gait is intact. Gait normal.       Palpation of the following region(s) revealed:  Cervical: Upper trapezius right, muscular hypertonicity.  Cervical paraspinals right, hypertonicity and tenderness.  Thoracic: Thoracic paraspinals right, muscular hypertonicity.  Middle trapezius right, muscular hypertonicity.  Pectoralis right, muscular hypertonicity.  Lumbar: Lumbar paraspinals bilateral, hypertonicity and tenderness.  Quadratus lumborum bilateral, hypertonicity and tenderness and muscular hypertonicity.  Gluteal bilateral, hypertonicity and tenderness.  Psoas bilateral, hypertonicity and tenderness.        Segmental Joint(s): Segmental joint dysfunction was assessed with motion palpation and is identified in the following areas:  Cervical : C4 C5  Thoracic : T3, T4, T7, and T8  Lumbopelvic / Sacral SIJ : L4, L5/S1, R SIJ, and L SIJ      Assessment/Plan   Today's Treatment Included: Chiropractic  manipulation to the Segmental Joint(s) Cervical : C4 C5 Segmental Joint(s) Lumbopelvic/Sacral SIJ : L4, L5/S1, R SIJ, and L SIJ Segmental Joint(s) Thoracic : T3, T4, T7, and T8   Treatment Techniques Used : Direct Non-force technique, Activator/Tool assisted technique, Pelvic blocking technique, and Low force    Supine psoas release BL    Soft-tissue mobilization was performed in the following areas:   Cervical paraspinal mm. bilateral and Upper Trapezius bilateral  Thoracic Paraspinal mm. bilateral, Rhomboids bilateral, and Pectoralis bilateral  Lumbar Paraspinal mm. bilateral, Quadratus Lumborum bilateral, Gluteal mm. Glute. Med. bilateral, and Psoas bilateral    F/U in 2-3 weeks    The patient tolerated today's treatment with little or no additional discomfort and was instructed to contact the office for questions or concerns.

## 2024-10-17 ENCOUNTER — APPOINTMENT (OUTPATIENT)
Dept: RADIOLOGY | Facility: CLINIC | Age: 74
End: 2024-10-17
Payer: MEDICARE

## 2024-10-18 ENCOUNTER — APPOINTMENT (OUTPATIENT)
Dept: RADIOLOGY | Facility: CLINIC | Age: 74
End: 2024-10-18
Payer: MEDICARE

## 2024-10-18 ENCOUNTER — HOSPITAL ENCOUNTER (OUTPATIENT)
Dept: RADIOLOGY | Facility: HOSPITAL | Age: 74
Discharge: HOME | End: 2024-10-18
Payer: MEDICARE

## 2024-10-18 VITALS — HEIGHT: 66 IN | WEIGHT: 120 LBS | BODY MASS INDEX: 19.29 KG/M2

## 2024-10-18 DIAGNOSIS — Z12.31 ENCOUNTER FOR SCREENING MAMMOGRAM FOR MALIGNANT NEOPLASM OF BREAST: ICD-10-CM

## 2024-10-18 PROCEDURE — 77067 SCR MAMMO BI INCL CAD: CPT

## 2024-10-22 ENCOUNTER — APPOINTMENT (OUTPATIENT)
Dept: BEHAVIORAL HEALTH | Facility: CLINIC | Age: 74
End: 2024-10-22
Payer: MEDICARE

## 2024-10-23 ENCOUNTER — PATIENT MESSAGE (OUTPATIENT)
Dept: PRIMARY CARE | Facility: CLINIC | Age: 74
End: 2024-10-23

## 2024-10-23 ENCOUNTER — APPOINTMENT (OUTPATIENT)
Dept: INTEGRATIVE MEDICINE | Facility: CLINIC | Age: 74
End: 2024-10-23
Payer: MEDICARE

## 2024-10-23 DIAGNOSIS — M99.01 SEGMENTAL DYSFUNCTION OF CERVICAL REGION: ICD-10-CM

## 2024-10-23 DIAGNOSIS — M99.03 SEGMENTAL AND SOMATIC DYSFUNCTION OF LUMBAR REGION: Primary | ICD-10-CM

## 2024-10-23 DIAGNOSIS — M54.2 CERVICALGIA: ICD-10-CM

## 2024-10-23 DIAGNOSIS — M99.04 SEGMENTAL AND SOMATIC DYSFUNCTION OF SACRAL REGION: ICD-10-CM

## 2024-10-23 DIAGNOSIS — M54.50 CHRONIC BILATERAL LOW BACK PAIN WITHOUT SCIATICA: ICD-10-CM

## 2024-10-23 DIAGNOSIS — M25.552 LEFT HIP PAIN: ICD-10-CM

## 2024-10-23 DIAGNOSIS — G89.29 CHRONIC BILATERAL LOW BACK PAIN WITHOUT SCIATICA: ICD-10-CM

## 2024-10-23 DIAGNOSIS — M25.552 PAIN OF LEFT HIP: Primary | ICD-10-CM

## 2024-10-23 DIAGNOSIS — M99.02 SEGMENTAL AND SOMATIC DYSFUNCTION OF THORACIC REGION: ICD-10-CM

## 2024-10-23 DIAGNOSIS — M99.05 SEGMENTAL AND SOMATIC DYSFUNCTION OF PELVIC REGION: ICD-10-CM

## 2024-10-23 NOTE — PROGRESS NOTES
Subjective   Patient ID: Taylor Kerns is a 74 y.o. female who presents October 23, 2024 for lower back and neck pain.    MCR    Today, the patient rates their degree of pain as a 7 out of 10 on the numeric pain rating scale.     HPI : Taylor presents to my office with main complaint of lower back, hip and neck pain. She reports worsening left hip pain localized along the anterior and lateral aspects. She also reports pain in the left gluteal region. Notes discomfort is present with any weight bearing activities but no issues when at rest or laying in bed at night. She also reports that last week she spent 4 hours canoeing with increased neck discomfort after. This has improved mildly since onset. No radicular complaints reported. Will be returning to [facility] 1 month. Denies new trauma/incident.       Objective   Physical Exam  Neurological:      General: No focal deficit present.      Mental Status: She is alert and oriented to person, place, and time.      Cranial Nerves: No dysarthria or facial asymmetry.      Sensory: Sensation is intact.      Motor: Motor function is intact.      Coordination: Coordination is intact.      Gait: Gait is intact. Gait normal.       Palpation of the following region(s) revealed:  Cervical: Upper trapezius right, muscular hypertonicity.  Cervical paraspinals right, hypertonicity and tenderness.  Thoracic: Thoracic paraspinals right, muscular hypertonicity.  Middle trapezius right, muscular hypertonicity.  Pectoralis right, muscular hypertonicity.  Lumbar: Lumbar paraspinals bilateral, hypertonicity and tenderness.  Quadratus lumborum bilateral, hypertonicity and tenderness and muscular hypertonicity.  Gluteal bilateral, hypertonicity and tenderness.  Psoas bilateral, hypertonicity and tenderness.        Segmental Joint(s): Segmental joint dysfunction was assessed with motion palpation and is identified in the following areas:  Cervical : C4 C5  Thoracic : T3, T4, T7, and  T8  Lumbopelvic / Sacral SIJ : L4, L5/S1, R SIJ, and L SIJ      Assessment/Plan   Today's Treatment Included: Chiropractic manipulation to the Segmental Joint(s) Cervical : C4 C5 Segmental Joint(s) Lumbopelvic/Sacral SIJ : L4, L5/S1, R SIJ, and L SIJ Segmental Joint(s) Thoracic : T3, T4, T7, and T8   Treatment Techniques Used : Direct Non-force technique, Activator/Tool assisted technique, Pelvic blocking technique, and Low force    Supine psoas release BL    Soft-tissue mobilization was performed in the following areas:   Cervical paraspinal mm. bilateral and Upper Trapezius bilateral  Thoracic Paraspinal mm. bilateral, Rhomboids bilateral, and Pectoralis bilateral  Lumbar Paraspinal mm. bilateral, Quadratus Lumborum bilateral, Gluteal mm. Glute. Med. bilateral, and Psoas bilateral    Discussed referral for LB and left hip x-rays    F/U as needed and beneficial when home from [place]    The patient tolerated today's treatment with little or no additional discomfort and was instructed to contact the office for questions or concerns.

## 2024-10-24 ENCOUNTER — APPOINTMENT (OUTPATIENT)
Dept: BEHAVIORAL HEALTH | Facility: CLINIC | Age: 74
End: 2024-10-24
Payer: MEDICARE

## 2024-10-24 DIAGNOSIS — F33.41 RECURRENT MAJOR DEPRESSIVE DISORDER, IN PARTIAL REMISSION (CMS-HCC): ICD-10-CM

## 2024-10-24 DIAGNOSIS — F41.9 ANXIETY: ICD-10-CM

## 2024-10-24 PROCEDURE — 99214 OFFICE O/P EST MOD 30 MIN: CPT | Performed by: PSYCHIATRY & NEUROLOGY

## 2024-10-24 RX ORDER — MIRTAZAPINE 7.5 MG/1
7.5 TABLET, FILM COATED ORAL NIGHTLY
Qty: 90 TABLET | Refills: 0 | Status: SHIPPED | OUTPATIENT
Start: 2024-10-24 | End: 2025-01-22

## 2024-10-24 RX ORDER — LORAZEPAM 1 MG/1
0.5 TABLET ORAL 2 TIMES DAILY PRN
Qty: 30 TABLET | Refills: 0 | Status: SHIPPED | OUTPATIENT
Start: 2024-10-24 | End: 2025-01-22

## 2024-10-24 RX ORDER — ARIPIPRAZOLE 2 MG/1
2 TABLET ORAL DAILY
Qty: 90 TABLET | Refills: 0 | Status: SHIPPED | OUTPATIENT
Start: 2024-10-24 | End: 2025-01-22

## 2024-10-24 RX ORDER — BUSPIRONE HYDROCHLORIDE 5 MG/1
5 TABLET ORAL DAILY
Qty: 90 TABLET | Refills: 0 | Status: SHIPPED | OUTPATIENT
Start: 2024-10-24 | End: 2025-01-22

## 2024-10-24 ASSESSMENT — ENCOUNTER SYMPTOMS
NERVOUS/ANXIOUS: 1
DYSPHORIC MOOD: 0

## 2024-10-24 NOTE — PROGRESS NOTES
"Adult Ambulatory Psychiatry Progress Note  Virtual or Telephone Consent    An interactive audio and video telecommunication system which permits real time communications between the patient (at the originating site) and provider (at the distant site) was utilized to provide this telehealth service.   Verbal consent was requested and obtained from Taylor Kerns on this date, 10/24/24 for a telehealth visit.      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     Plan:   Depression - abilify 2mg daily  Anxiety/insomnia - buspar 2.5mg qhs, mirtazapine 7.5mg at bedtime, ativan 0.25mg QID prn      Subjective   Chief Complaint: Depression, Anxiety, and Insomnia  HPI:  Pt arrived on time. She's been feeling \"zoned out\" during the day from the buspar. She stopped taking it in the morning but still takes in at night. She's going back to [place] in November.       OARRS:  Chuck Cantu MD on 10/24/2024 10:30 AM  I have personally reviewed the OARRS report for Taylor Kerns. I have considered the risks of abuse, dependence, addiction and diversion    Is the patient prescribed a combination of a benzodiazepine and opioid?  No      Last Urine Drug Screen / ordered today: No  Recent Results (from the past 8760 hours)   Confirmation Opiate/Opioid/Benzo Prescription Compliance    Collection Time: 06/14/24 10:07 AM   Result Value Ref Range    Clonazepam <25 <25 ng/mL    7-Aminoclonazepam <25 <25 ng/mL    Alprazolam <25 <25 ng/mL    Alpha-Hydroxyalprazolam <25 <25 ng/mL    Midazolam <25 <25 ng/mL    Alpha-Hydroxymidazolam <25 <25 ng/mL    Chlordiazepoxide <25 <25 ng/mL    Diazepam <25 <25 ng/mL    Nordiazepam <25 <25 ng/mL    Temazepam <25 <25 ng/mL    Oxazepam <25 <25 ng/mL    Lorazepam 693 (H) <25 ng/mL    Methadone <25 <25 ng/mL    EDDP <25 <25 ng/mL    6-Acetylmorphine <25 <25 ng/mL " "   Codeine <50 <50 ng/mL    Hydrocodone <25 <25 ng/mL    Hydromorphone <25 <25 ng/mL    Morphine  <50 <50 ng/mL    Norhydrocodone <25 <25 ng/mL    Noroxycodone <25 <25 ng/mL    Oxycodone <25 <25 ng/mL    Oxymorphone <25 <25 ng/mL    Fentanyl <2.5 <2.5 ng/mL    Norfentanyl <2.5 <2.5 ng/mL    Tramadol <50 <50 ng/mL    O-Desmethyltramadol <50 <50 ng/mL    Zolpidem <25 <25 ng/mL    Zolpidem Metabolite (ZCA) <25 <25 ng/mL   Screen Opiate/Opioid/Benzo Prescription Compliance    Collection Time: 06/14/24 10:07 AM   Result Value Ref Range    Creatinine, Urine Random 102.7 20.0 - 320.0 mg/dL    Amphetamine Screen, Urine Presumptive Negative Presumptive Negative    Barbiturate Screen, Urine Presumptive Negative Presumptive Negative    Cannabinoid Screen, Urine Presumptive Negative Presumptive Negative    Cocaine Metabolite Screen, Urine Presumptive Negative Presumptive Negative    PCP Screen, Urine Presumptive Negative Presumptive Negative     Results are as expected.       Controlled Substance Agreement:  Date of the Last Agreement: 9/20/24  Reviewed Controlled Substance Agreement including but not limited to the benefits, risks, and alternatives to treatment with a Controlled Substance medication(s).      Review of Systems   Psychiatric/Behavioral:  Negative for dysphoric mood and suicidal ideas. The patient is nervous/anxious.            Objective   Mental Status Exam:  General Appearance: Well groomed, appropriate eye contact  Attitude/Behavior: Cooperative  Motor: No psychomotor agitation or retardation, no tremor or other abnormal movements  Speech: Normal rate, volume, prosody  Mood: \"zoned out\"  Affect: Constricted  Thought Process: Linear, goal directed  Thought Associations: No loosening of associations  Thought Content: Normal  Perception: No perceptual abnormalities noted  Insight: Intact  Judgement: Intact    Vitals:  There were no vitals filed for this visit.    Current Medications:  Current Outpatient " Medications on File Prior to Visit   Medication Sig Dispense Refill    hydroquinone 4 % cream Apply as directed  Basilio Hernandez MD      minoxidil (Rogaine) 2 % external solution Apply topically 2 times a day. 180 mL 1    MULTIVITAMIN ORAL Take by mouth.      spironolactone (Aldactone) 50 mg tablet Take 1 tablet (50 mg) by mouth once daily. 90 tablet 1    tretinoin (Retin-A) 0.1 % cream Apply topically once daily at bedtime.      [DISCONTINUED] ARIPiprazole (Abilify) 2 mg tablet Take 1 tablet (2 mg) by mouth once daily. 90 tablet 0    [DISCONTINUED] busPIRone (Buspar) 5 mg tablet Take 1 tablet (5 mg) by mouth 2 times a day. 180 tablet 0    [DISCONTINUED] LORazepam (Ativan) 1 mg tablet Take 0.5 tablets (0.5 mg) by mouth 2 times a day as needed for anxiety. 30 tablet 2    [DISCONTINUED] mirtazapine (Remeron) 7.5 mg tablet Take 1 tablet (7.5 mg) by mouth once daily at bedtime. 30 tablet 1     No current facility-administered medications on file prior to visit.         Orders:  Diagnoses and all orders for this visit:  Recurrent major depressive disorder, in partial remission (CMS-HCC)  -     Follow Up In Psychiatry  -     ARIPiprazole (Abilify) 2 mg tablet; Take 1 tablet (2 mg) by mouth once daily.  -     mirtazapine (Remeron) 7.5 mg tablet; Take 1 tablet (7.5 mg) by mouth once daily at bedtime.  -     Follow Up In Psychiatry; Future  Anxiety  -     busPIRone (Buspar) 5 mg tablet; Take 1 tablet (5 mg) by mouth once daily.  -     LORazepam (Ativan) 1 mg tablet; Take 0.5 tablets (0.5 mg) by mouth 2 times a day as needed for anxiety.          Risk Assessment:  Risk of harm to self: Low Risk -- Risk factors include: Age and Depression Protective factors include:Denies current suicidal ideation, Denies history of suicide attempts , Future-oriented talk , Willingness to seek help and support , Gender, Skills in problem solving, conflict resolution, and nonviolent handling of disputes, Cultural and Buddhist beliefs that  discourage suicide and support self-preservation , Access to a variety of clinical interventions , Receiving and engaged in care for mental, physical, and substance use disorders , History of adhering to treatment recommendations and/or prescribed medication regimen , Support through ongoing medical and mental healthcare relationships , Current/history of good response to treatment/meds , and Interpersonal relationships and supports, e.g., family, friends, peers, community     Risk of harm to others: Low Risk - Risk factors include: No significant risk factors identified on screening. Protective factors include: Lack of known history of harm to others , Lack of known history of violent ideation , Lack of known access to firearms , Sense of community, availability/access to resources and support , Sense of optimism, hope , Interpersonal competence , Affect regulation , Sense of self-efficacy, internal locus of control , and Positive, pro-social family/peer network         Time Spent:    Prep time: 2 min  Direct patient time: 26 min  Documentation time: 5 min  Total time: 33 min      Next Appointment:  Follow up in 13 days (on 11/6/2024).

## 2024-10-28 ENCOUNTER — HOSPITAL ENCOUNTER (OUTPATIENT)
Dept: RADIOLOGY | Facility: CLINIC | Age: 74
Discharge: HOME | End: 2024-10-28
Payer: MEDICARE

## 2024-10-28 DIAGNOSIS — M54.50 CHRONIC BILATERAL LOW BACK PAIN WITHOUT SCIATICA: ICD-10-CM

## 2024-10-28 DIAGNOSIS — F41.9 ANXIETY: ICD-10-CM

## 2024-10-28 DIAGNOSIS — G89.29 CHRONIC BILATERAL LOW BACK PAIN WITHOUT SCIATICA: ICD-10-CM

## 2024-10-28 DIAGNOSIS — M25.552 PAIN OF LEFT HIP: ICD-10-CM

## 2024-10-28 PROCEDURE — 73502 X-RAY EXAM HIP UNI 2-3 VIEWS: CPT | Mod: LT

## 2024-10-28 PROCEDURE — 73502 X-RAY EXAM HIP UNI 2-3 VIEWS: CPT | Mod: LEFT SIDE | Performed by: RADIOLOGY

## 2024-10-28 RX ORDER — BUSPIRONE HYDROCHLORIDE 5 MG/1
5 TABLET ORAL 2 TIMES DAILY
Qty: 180 TABLET | Refills: 0 | Status: SHIPPED | OUTPATIENT
Start: 2024-10-28

## 2024-11-05 ENCOUNTER — APPOINTMENT (OUTPATIENT)
Dept: DERMATOLOGY | Facility: CLINIC | Age: 74
End: 2024-11-05
Payer: MEDICARE

## 2024-11-06 ENCOUNTER — APPOINTMENT (OUTPATIENT)
Dept: BEHAVIORAL HEALTH | Facility: CLINIC | Age: 74
End: 2024-11-06
Payer: MEDICARE

## 2024-11-06 DIAGNOSIS — F41.9 ANXIETY: ICD-10-CM

## 2024-11-06 DIAGNOSIS — F51.04 CHRONIC INSOMNIA: ICD-10-CM

## 2024-11-06 DIAGNOSIS — F33.41 RECURRENT MAJOR DEPRESSIVE DISORDER, IN PARTIAL REMISSION (CMS-HCC): ICD-10-CM

## 2024-11-06 PROCEDURE — 1159F MED LIST DOCD IN RCRD: CPT | Performed by: PSYCHIATRY & NEUROLOGY

## 2024-11-06 PROCEDURE — 99213 OFFICE O/P EST LOW 20 MIN: CPT | Performed by: PSYCHIATRY & NEUROLOGY

## 2024-11-06 PROCEDURE — 1160F RVW MEDS BY RX/DR IN RCRD: CPT | Performed by: PSYCHIATRY & NEUROLOGY

## 2024-11-06 RX ORDER — ARIPIPRAZOLE 2 MG/1
4 TABLET ORAL DAILY
Qty: 180 TABLET | Refills: 1 | Status: SHIPPED | OUTPATIENT
Start: 2024-11-06 | End: 2025-05-05

## 2024-11-06 RX ORDER — LORAZEPAM 1 MG/1
0.5 TABLET ORAL 2 TIMES DAILY PRN
Qty: 30 TABLET | Refills: 2 | Status: SHIPPED | OUTPATIENT
Start: 2024-11-22 | End: 2025-02-20

## 2024-11-06 RX ORDER — BUSPIRONE HYDROCHLORIDE 5 MG/1
5 TABLET ORAL 2 TIMES DAILY
Qty: 180 TABLET | Refills: 1 | Status: SHIPPED | OUTPATIENT
Start: 2024-11-06 | End: 2025-05-05

## 2024-11-06 RX ORDER — MIRTAZAPINE 7.5 MG/1
7.5 TABLET, FILM COATED ORAL NIGHTLY
Qty: 90 TABLET | Refills: 1 | Status: SHIPPED | OUTPATIENT
Start: 2024-11-06 | End: 2025-05-05

## 2024-11-06 ASSESSMENT — ENCOUNTER SYMPTOMS
DYSPHORIC MOOD: 0
NERVOUS/ANXIOUS: 1

## 2024-11-06 NOTE — PROGRESS NOTES
Adult Ambulatory Psychiatry Progress Note  Virtual or Telephone Consent    An interactive audio and video telecommunication system which permits real time communications between the patient (at the originating site) and provider (at the distant site) was utilized to provide this telehealth service.   Verbal consent was requested and obtained from Taylor Kerns on this date, 11/07/24 for a telehealth visit.      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     Plan:   Depression - abilify 4mg daily  Anxiety/insomnia - buspar 2.5mg qhs, mirtazapine 7.5mg at bedtime, ativan 0.25mg QID prn      Subjective   Chief Complaint: Depression, Anxiety, and Insomnia  HPI:  Pt arrived on time. She's feeling down about the election results. She's packing to head back to [place]. Discussed options for treatment while out of state. She plans to work on anxiety and depression with therapist and leave her medicines as they are.       OARRS:  Chuck Cantu MD on 11/6/2024  4:07 PM  I have personally reviewed the OARRS report for Taylor Kerns. I have considered the risks of abuse, dependence, addiction and diversion    Is the patient prescribed a combination of a benzodiazepine and opioid?  No      Last Urine Drug Screen / ordered today: No  Recent Results (from the past 8760 hours)   Confirmation Opiate/Opioid/Benzo Prescription Compliance    Collection Time: 06/14/24 10:07 AM   Result Value Ref Range    Clonazepam <25 <25 ng/mL    7-Aminoclonazepam <25 <25 ng/mL    Alprazolam <25 <25 ng/mL    Alpha-Hydroxyalprazolam <25 <25 ng/mL    Midazolam <25 <25 ng/mL    Alpha-Hydroxymidazolam <25 <25 ng/mL    Chlordiazepoxide <25 <25 ng/mL    Diazepam <25 <25 ng/mL    Nordiazepam <25 <25 ng/mL    Temazepam <25 <25 ng/mL    Oxazepam <25 <25 ng/mL    Lorazepam 693 (H) <25 ng/mL    Methadone <25  "<25 ng/mL    EDDP <25 <25 ng/mL    6-Acetylmorphine <25 <25 ng/mL    Codeine <50 <50 ng/mL    Hydrocodone <25 <25 ng/mL    Hydromorphone <25 <25 ng/mL    Morphine  <50 <50 ng/mL    Norhydrocodone <25 <25 ng/mL    Noroxycodone <25 <25 ng/mL    Oxycodone <25 <25 ng/mL    Oxymorphone <25 <25 ng/mL    Fentanyl <2.5 <2.5 ng/mL    Norfentanyl <2.5 <2.5 ng/mL    Tramadol <50 <50 ng/mL    O-Desmethyltramadol <50 <50 ng/mL    Zolpidem <25 <25 ng/mL    Zolpidem Metabolite (ZCA) <25 <25 ng/mL   Screen Opiate/Opioid/Benzo Prescription Compliance    Collection Time: 06/14/24 10:07 AM   Result Value Ref Range    Creatinine, Urine Random 102.7 20.0 - 320.0 mg/dL    Amphetamine Screen, Urine Presumptive Negative Presumptive Negative    Barbiturate Screen, Urine Presumptive Negative Presumptive Negative    Cannabinoid Screen, Urine Presumptive Negative Presumptive Negative    Cocaine Metabolite Screen, Urine Presumptive Negative Presumptive Negative    PCP Screen, Urine Presumptive Negative Presumptive Negative     Results are as expected.       Controlled Substance Agreement:  Date of the Last Agreement: 9/20/24  Reviewed Controlled Substance Agreement including but not limited to the benefits, risks, and alternatives to treatment with a Controlled Substance medication(s).      Review of Systems   Psychiatric/Behavioral:  Negative for dysphoric mood and suicidal ideas. The patient is nervous/anxious.            Objective   Mental Status Exam:  General Appearance: Well groomed, appropriate eye contact  Attitude/Behavior: Cooperative  Motor: No psychomotor agitation or retardation, no tremor or other abnormal movements  Speech: Normal rate, volume, prosody  Mood: \"ok\"  Affect: Euthymic, full-range  Thought Process: Linear, goal directed  Thought Associations: No loosening of associations  Thought Content: Normal  Perception: No perceptual abnormalities noted  Insight: Intact  Judgement: Intact    Vitals:  There were no vitals filed " for this visit.    Current Medications:  Current Outpatient Medications on File Prior to Visit   Medication Sig Dispense Refill    hydroquinone 4 % cream Apply as directed  Basilio Hernandez MD      minoxidil (Rogaine) 2 % external solution Apply topically 2 times a day. 180 mL 1    MULTIVITAMIN ORAL Take by mouth.      spironolactone (Aldactone) 50 mg tablet Take 1 tablet (50 mg) by mouth once daily. 90 tablet 1    tretinoin (Retin-A) 0.1 % cream Apply topically once daily at bedtime.      [DISCONTINUED] ARIPiprazole (Abilify) 2 mg tablet Take 1 tablet (2 mg) by mouth once daily. 90 tablet 0    [DISCONTINUED] busPIRone (Buspar) 5 mg tablet TAKE ONE TABLET BY MOUTH TWICE A  tablet 0    [DISCONTINUED] LORazepam (Ativan) 1 mg tablet Take 0.5 tablets (0.5 mg) by mouth 2 times a day as needed for anxiety. 30 tablet 0    [DISCONTINUED] mirtazapine (Remeron) 7.5 mg tablet Take 1 tablet (7.5 mg) by mouth once daily at bedtime. 90 tablet 0     No current facility-administered medications on file prior to visit.         Orders:  Diagnoses and all orders for this visit:  Recurrent major depressive disorder, in partial remission (CMS-HCC)  -     Follow Up In Psychiatry  -     ARIPiprazole (Abilify) 2 mg tablet; Take 2 tablets (4 mg) by mouth once daily.  -     mirtazapine (Remeron) 7.5 mg tablet; Take 1 tablet (7.5 mg) by mouth once daily at bedtime.  Anxiety  -     busPIRone (Buspar) 5 mg tablet; Take 1 tablet (5 mg) by mouth 2 times a day.  -     LORazepam (Ativan) 1 mg tablet; Take 0.5 tablets (0.5 mg) by mouth 2 times a day as needed for anxiety. Do not fill before November 22, 2024.  Chronic insomnia          Risk Assessment:  Risk of harm to self: Low Risk -- Risk factors include: Age and Depression Protective factors include:Denies current suicidal ideation, Denies history of suicide attempts , Future-oriented talk , Willingness to seek help and support , Gender, Skills in problem solving, conflict resolution, and  nonviolent handling of disputes, Cultural and Yarsanism beliefs that discourage suicide and support self-preservation , Access to a variety of clinical interventions , Receiving and engaged in care for mental, physical, and substance use disorders , History of adhering to treatment recommendations and/or prescribed medication regimen , Support through ongoing medical and mental healthcare relationships , Current/history of good response to treatment/meds , and Interpersonal relationships and supports, e.g., family, friends, peers, community     Risk of harm to others: Low Risk - Risk factors include: No significant risk factors identified on screening. Protective factors include: Lack of known history of harm to others , Lack of known history of violent ideation , Lack of known access to firearms , Sense of community, availability/access to resources and support , Sense of optimism, hope , Interpersonal competence , Affect regulation , Sense of self-efficacy, internal locus of control , and Positive, pro-social family/peer network         Next Appointment:  No follow-ups on file.

## 2024-11-18 ENCOUNTER — APPOINTMENT (OUTPATIENT)
Dept: INTEGRATIVE MEDICINE | Facility: CLINIC | Age: 74
End: 2024-11-18
Payer: MEDICARE

## 2025-02-19 DIAGNOSIS — F33.41 RECURRENT MAJOR DEPRESSIVE DISORDER, IN PARTIAL REMISSION (CMS-HCC): ICD-10-CM

## 2025-02-20 RX ORDER — ARIPIPRAZOLE 2 MG/1
TABLET ORAL
Qty: 180 TABLET | Refills: 1 | Status: SHIPPED | OUTPATIENT
Start: 2025-02-20

## 2025-05-09 ASSESSMENT — PATIENT GLOBAL ASSESSMENT (PGA): WHAT IS THE PGA: PATIENT GLOBAL ASSESSMENT:  2 - MILD

## 2025-05-09 ASSESSMENT — DERMATOLOGY QUALITY OF LIFE (QOL) ASSESSMENT
RATE HOW BOTHERED YOU ARE BY EFFECTS OF YOUR SKIN PROBLEMS ON YOUR ACTIVITIES (EG, GOING OUT, ACCOMPLISHING WHAT YOU WANT, WORK ACTIVITIES OR YOUR RELATIONSHIPS WITH OTHERS): 1
RATE HOW BOTHERED YOU ARE BY SYMPTOMS OF YOUR SKIN PROBLEM (EG, ITCHING, STINGING BURNING, HURTING OR SKIN IRRITATION): 0 - NEVER BOTHERED
RATE HOW BOTHERED YOU ARE BY SYMPTOMS OF YOUR SKIN PROBLEM (EG, ITCHING, STINGING BURNING, HURTING OR SKIN IRRITATION): 0 - NEVER BOTHERED
RATE HOW BOTHERED YOU ARE BY EFFECTS OF YOUR SKIN PROBLEMS ON YOUR ACTIVITIES (EG, GOING OUT, ACCOMPLISHING WHAT YOU WANT, WORK ACTIVITIES OR YOUR RELATIONSHIPS WITH OTHERS): 1
RATE HOW EMOTIONALLY BOTHERED YOU ARE BY YOUR SKIN PROBLEM (FOR EXAMPLE, WORRY, EMBARRASSMENT, FRUSTRATION): 2
RATE HOW EMOTIONALLY BOTHERED YOU ARE BY YOUR SKIN PROBLEM (FOR EXAMPLE, WORRY, EMBARRASSMENT, FRUSTRATION): 2

## 2025-05-16 ENCOUNTER — APPOINTMENT (OUTPATIENT)
Dept: DERMATOLOGY | Facility: CLINIC | Age: 75
End: 2025-05-16
Payer: MEDICARE

## 2025-05-16 DIAGNOSIS — L30.4 INTERTRIGO: ICD-10-CM

## 2025-05-16 DIAGNOSIS — D48.5 NEOPLASM OF UNCERTAIN BEHAVIOR OF SKIN: Primary | ICD-10-CM

## 2025-05-16 DIAGNOSIS — L57.0 ACTINIC KERATOSIS: ICD-10-CM

## 2025-05-16 DIAGNOSIS — L82.1 SEBORRHEIC KERATOSIS: ICD-10-CM

## 2025-05-16 DIAGNOSIS — L57.8 DIFFUSE PHOTODAMAGE OF SKIN: ICD-10-CM

## 2025-05-16 DIAGNOSIS — L21.9 SEBORRHEIC DERMATITIS: ICD-10-CM

## 2025-05-16 DIAGNOSIS — Z85.828 HISTORY OF NONMELANOMA SKIN CANCER: ICD-10-CM

## 2025-05-16 DIAGNOSIS — D22.5 MELANOCYTIC NEVUS OF TRUNK: ICD-10-CM

## 2025-05-16 DIAGNOSIS — D18.01 HEMANGIOMA OF SKIN: ICD-10-CM

## 2025-05-16 ASSESSMENT — DERMATOLOGY PATIENT ASSESSMENT
ARE YOU ON BIRTH CONTROL: NO
HAVE YOU HAD OR DO YOU HAVE A STAPH INFECTION: NO
HAVE YOU HAD OR DO YOU HAVE VASCULAR DISEASE: NO
DO YOU HAVE IRREGULAR MENSTRUAL CYCLES: NO
ARE YOU AN ORGAN TRANSPLANT RECIPIENT: NO
ARE YOU TRYING TO GET PREGNANT: NO
DO YOU USE A TANNING BED: YES, PREVIOUSLY
DO YOU USE SUNSCREEN: DAILY
DO YOU HAVE ANY NEW OR CHANGING LESIONS: NO

## 2025-05-16 ASSESSMENT — DERMATOLOGY QUALITY OF LIFE (QOL) ASSESSMENT
ARE THERE EXCLUSIONS OR EXCEPTIONS FOR THE QUALITY OF LIFE ASSESSMENT: NO
RATE HOW EMOTIONALLY BOTHERED YOU ARE BY YOUR SKIN PROBLEM (FOR EXAMPLE, WORRY, EMBARRASSMENT, FRUSTRATION): 1
RATE HOW BOTHERED YOU ARE BY SYMPTOMS OF YOUR SKIN PROBLEM (EG, ITCHING, STINGING BURNING, HURTING OR SKIN IRRITATION): 1
DATE THE QUALITY-OF-LIFE ASSESSMENT WAS COMPLETED: 67341
RATE HOW BOTHERED YOU ARE BY EFFECTS OF YOUR SKIN PROBLEMS ON YOUR ACTIVITIES (EG, GOING OUT, ACCOMPLISHING WHAT YOU WANT, WORK ACTIVITIES OR YOUR RELATIONSHIPS WITH OTHERS): 1
WHAT SINGLE SKIN CONDITION LISTED BELOW IS THE PATIENT ANSWERING THE QUALITY-OF-LIFE ASSESSMENT QUESTIONS ABOUT: NONE OF THE ABOVE

## 2025-05-16 ASSESSMENT — ITCH NUMERIC RATING SCALE: HOW SEVERE IS YOUR ITCHING?: 0

## 2025-05-16 ASSESSMENT — PATIENT GLOBAL ASSESSMENT (PGA): PATIENT GLOBAL ASSESSMENT: PATIENT GLOBAL ASSESSMENT:  1 - CLEAR

## 2025-05-16 NOTE — Clinical Note
Intertrigo -intergluteal cleft.  The potentially chronic and intermittently flaring nature of this condition, which likely involves colonization with Candidal yeast, and treatment options were discussed extensively with the patient today.  At this time, I recommend topical anti-fungal therapy with Ketoconazole 2% cream, which the patient was instructed to apply twice daily to the affected areas for the next 2-3 weeks, followed by taper to weekends only with use of over-the-counter Zeasorb AF powder once daily for maintenance; the patient may repeat treatment in a similar fashion as needed for future flares.  The risks, benefits, and side effects of these medications were discussed.  The patient expressed understanding and is in agreement with this plan.

## 2025-05-16 NOTE — Clinical Note
On the patient's left nasal dorsum, left anterior shoulder, right nasal ala, left upper back, left thigh, mid chest, and left mid back, there are well-healed scars with no evidence of recurrent growth on exam today.

## 2025-05-16 NOTE — Clinical Note
On the patient's face, mainly the glabella and bilateral eyebrows and perinasal creases, there are pink, scaly patches with whitish-yellowish, greasy scale

## 2025-05-16 NOTE — Clinical Note
Seborrheic Dermatitis - flare on face.  The potentially chronic and intermittently flaring nature of this condition and treatment options were discussed extensively with the patient today.  At this time, I recommend topical anti-fungal therapy with Ketoconazole 2% cream, which the patient was instructed to apply twice daily to the affected areas of the face.  The risks, benefits, and side effects of this medication were discussed.  The patient expressed understanding and is in agreement with this plan.

## 2025-05-16 NOTE — Clinical Note
In the patient's intergluteal cleft, there are red, moist, slightly scaly patches located in skin folds.

## 2025-05-16 NOTE — Clinical Note
Actinic Keratoses -scattered on chest.  The pre-cancerous nature of these lesions and treatment options were discussed with the patient today.  At this time, I recommend treatment with liquid nitrogen cryotherapy.  The patient expressed understanding, is in agreement with this plan, and wishes to proceed with cryotherapy today.

## 2025-05-16 NOTE — Clinical Note
Ivy Angiomas - the benign nature of these vascular lesions was discussed with the patient today and reassurance provided.  No treatment is medically indicated for these lesions at this time.

## 2025-05-16 NOTE — Clinical Note
History of several nonmelanoma skin cancers, dysplastic nevus, and actinic keratoses and photodamage.  There is no evidence of recurrence on exam today.  The signs and symptoms of skin cancer were reviewed and the patient was advised to practice sun protection and sun avoidance, use daily sunscreen, and perform regular self skin exams.  I will have the patient return to our office in 6 months, pending the above biopsy results, for routine follow-up and skin exam, and the patient was instructed to call our office should the patient notice any new, changing, symptomatic, or otherwise concerning skin lesions before then.  The patient expressed understanding and is in agreement with this plan.

## 2025-05-17 RX ORDER — KETOCONAZOLE 20 MG/G
CREAM TOPICAL
Qty: 60 G | Refills: 11 | Status: SHIPPED | OUTPATIENT
Start: 2025-05-17

## 2025-05-17 NOTE — PROGRESS NOTES
Subjective     Taylor Kerns is a 74 y.o. female who presents for the following: Skin Check.  She notes dry, flaky skin on her face recently, especially in the creases of her nose, and she states she left her ketoconazole cream in [place].  She also notes a scaly area on the left side of her nose, which has been present for a few months and does not heal.  Lastly, she notes redness and irritation between her buttocks.  She denies any other new, changing, or concerning skin lesions since her last visit; no bleeding, itching, or burning lesions.      Review of Systems:  No other skin or systemic complaints other than what is documented elsewhere in the note.    The following portions of the chart were reviewed this encounter and updated as appropriate:       Skin Cancer History  Biopsy Log Book  Biopsied Type Location Status   7/1/24 SCC in Situ Left Nasal Dorsum Refer Mohs/Surgeon   7/1/24 SCC in Situ Left Anterior Shoulder Schedule Procedure       Additional History      Specialty Problems          Dermatology Problems    Actinic keratosis    Basal cell carcinoma of skin of other part of trunk    Basal cell carcinoma of skin of right upper limb, including shoulder    Melanocytic nevi of scalp and neck    Neoplasm of uncertain behavior of skin    Other hypertrophic disorders of the skin    Other melanin hyperpigmentation    Other viral warts    Skin changes due to chronic exposure to nonionizing radiation, unspecified       Past Dermatologic / Past Relevant Medical History:    - 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 Gallagher on 9/26/24 and ED&C on 8/26/24, respectively  - BCC on right nasal ala diagnosed at initial visit on 11/2/20 s/p Mohs surgery by Dr. Adam on 11/20/20  - 2 BCCs on left upper back and left thigh both diagnosed by Dr. Dickinson on 6/26/23 and both s/p ED&C by Dr. 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    A full examination was performed including scalp, face, eyes, ears, nose, lips, neck, chest, axillae, abdomen, back, bilateral upper extremities, and bilateral lower extremities. All findings within normal limits unless otherwise noted below.    Assessment/Plan   Skin Exam  1. NEOPLASM OF UNCERTAIN BEHAVIOR OF SKIN (2)  Left Superior Nasal Sidewall  1 cm erythematous, scaly papule     Lesion biopsy  Type of biopsy: tangential    Informed consent: discussed and consent obtained    Timeout: patient name, date of birth, surgical site, and procedure verified    Procedure prep:  Patient was prepped and draped  Anesthesia: the lesion was anesthetized in a standard fashion    Anesthetic:  1% lidocaine w/ epinephrine 1-100,000 local infiltration  Instrument used: flexible razor blade    Hemostasis achieved with: aluminum chloride    Outcome: patient tolerated procedure well    Post-procedure details: wound care instructions given      Staff Communication: Dermatology Local Anesthesia: 1 % Lidocaine / Epinephrine - Amount:0.5ml  Specimen 1 - Dermatopathology- DERM LAB  Differential Diagnosis: AK v SCCIS  Check Margins Yes/No?:    Comments:    Dermpath Lab: Routine Histopathology  (formalin-fixed tissue)  Right Anterior Mid-Leg  8 mm dark brown pigmented, asymmetric macule with an asymmetric pigment network and irregular borders     Shave removal    Lesion length (cm):  0.8  Margin per side (cm):  0  Lesion diameter (cm):  0.8  Informed consent: discussed and consent obtained    Timeout: patient name, date of birth, surgical site, and procedure verified    Procedure prep:  Patient was prepped and draped  Anesthesia: the lesion was anesthetized in a standard fashion    Anesthetic:  1% lidocaine w/ epinephrine 1-100,000 local infiltration  Instrument used: flexible razor blade    Hemostasis achieved with: aluminum chloride    Outcome: patient tolerated procedure well    Post-procedure details: sterile dressing applied and wound care instructions given    Dressing type: bandage and petrolatum      Staff Communication: Dermatology Local Anesthesia: 1 % Lidocaine / Epinephrine - Amount:0.8ml  Specimen 2 - Dermatopathology- DERM LAB  Differential Diagnosis: lentigo v SK v pigmented SCCIS v AMH  Check Margins Yes/No?:    Comments:    Dermpath Lab: Routine Histopathology (formalin-fixed tissue)  2. ACTINIC KERATOSIS (5)  Right Breast (5)  Scattered on the patient's chest, there are 5 erythematous, gritty, scaly macules   Actinic Keratoses -scattered on chest.  The pre-cancerous nature of these lesions and treatment options were discussed with the patient today.  At this time, I recommend treatment with liquid nitrogen cryotherapy.  The patient expressed understanding, is in agreement with this plan, and wishes to proceed with cryotherapy today.  Destr of lesion - Right Breast (5)  Complexity: simple    Destruction method: cryotherapy    Informed consent: discussed and consent obtained    Lesion destroyed using liquid nitrogen: Yes    Cryotherapy cycles:  1  Outcome: patient tolerated procedure well with no complications    Post-procedure details: wound care instructions given    3. MELANOCYTIC NEVUS  OF TRUNK  Generalized  Scattered on the patient's face, neck, trunk, and extremities, there are several small, round- to oval-shaped, brown-pigmented and pink-colored, symmetric, uniform-appearing macules and dome-shaped papules  Clinically benign- to slightly atypical-appearing nevi - the clinically benign- to slightly atypical-appearing nature of the patient's nevi was discussed with the patient today.  None of the patient's nevi meet threshold for biopsy today.  I emphasized the importance of performing monthly self-skin exams using the ABCDs of monitoring moles, which were reviewed with the patient today and an informational hand-out provided.  I also emphasized the importance of sun avoidance and sun protection with daily sunscreen use.  The patient expressed understanding and is in agreement with this plan.  4. SEBORRHEIC KERATOSIS  Generalized  Scattered on the patient's face, neck, trunk, and extremities, there are multiple tan- to light brown-colored, hyperkeratotic, stuck-on appearing papules of varying size and shape  Seborrheic Keratoses - the benign nature of these lesions was discussed with the patient today and reassurance provided.  No treatment is medically indicated for these lesions at this time.  5. HEMANGIOMA OF SKIN  Generalized  Scattered on the patient's face, neck, trunk, and extremities, there are multiple small, round, cherry red- to purplish-colored, symmetric, uniform, vascular-appearing macules and papules  Cherry Angiomas - the benign nature of these vascular lesions was discussed with the patient today and reassurance provided.  No treatment is medically indicated for these lesions at this time.  6. SEBORRHEIC DERMATITIS  Head - Anterior (Face)  On the patient's face, mainly the glabella and bilateral eyebrows and perinasal creases, there are pink, scaly patches with whitish-yellowish, greasy scale  Seborrheic Dermatitis - flare on face.  The potentially chronic and intermittently  flaring nature of this condition and treatment options were discussed extensively with the patient today.  At this time, I recommend topical anti-fungal therapy with Ketoconazole 2% cream, which the patient was instructed to apply twice daily to the affected areas of the face.  The risks, benefits, and side effects of this medication were discussed.  The patient expressed understanding and is in agreement with this plan.  ketoconazole (NIZOral) 2 % cream - Head - Anterior (Face)  Apply twice daily to affected areas of face  7. INTERTRIGO  Gluteal Crease  In the patient's intergluteal cleft, there are red, moist, slightly scaly patches located in skin folds.  Intertrigo -intergluteal cleft.  The potentially chronic and intermittently flaring nature of this condition, which likely involves colonization with Candidal yeast, and treatment options were discussed extensively with the patient today.  At this time, I recommend topical anti-fungal therapy with Ketoconazole 2% cream, which the patient was instructed to apply twice daily to the affected areas for the next 2-3 weeks, followed by taper to weekends only with use of over-the-counter Zeasorb AF powder once daily for maintenance; the patient may repeat treatment in a similar fashion as needed for future flares.  The risks, benefits, and side effects of these medications were discussed.  The patient expressed understanding and is in agreement with this plan.  8. HISTORY OF NONMELANOMA SKIN CANCER  Generalized  On the patient's left nasal dorsum, left anterior shoulder, right nasal ala, left upper back, left thigh, mid chest, and left mid back, there are well-healed scars with no evidence of recurrent growth on exam today.  History of several nonmelanoma skin cancers, dysplastic nevus, and actinic keratoses and photodamage.  There is no evidence of recurrence on exam today.  The signs and symptoms of skin cancer were reviewed and the patient was advised to practice  sun protection and sun avoidance, use daily sunscreen, and perform regular self skin exams.  I will have the patient return to our office in 6 months, pending the above biopsy results, for routine follow-up and skin exam, and the patient was instructed to call our office should the patient notice any new, changing, symptomatic, or otherwise concerning skin lesions before then.  The patient expressed understanding and is in agreement with this plan.  9. DIFFUSE PHOTODAMAGE OF SKIN  Photodistributed  Diffuse photodamage with actinic changes with telangiectasia and mottled pigmentation in sun-exposed areas.  Photodamage.  The signs and symptoms of skin cancer were reviewed and the patient was advised to practice sun protection and sun avoidance, use daily sunscreen, and perform regular self skin exams.  Sun protection was discussed, including avoiding the mid-day sun, wearing a sunscreen with SPF at least 50, and stressing the need for reapplication of sunscreen and applying more than they think they need.          [1]   Current Outpatient Medications:     ARIPiprazole (Abilify) 2 mg tablet, TAKE TWO TABLETS BY MOUTH ONE TIME DAILY, Disp: 180 tablet, Rfl: 1    hydroquinone 4 % cream, Apply as directed Basilio Hernandez MD, Disp: , Rfl:     minoxidil (Rogaine) 2 % external solution, Apply topically 2 times a day., Disp: 180 mL, Rfl: 1    MULTIVITAMIN ORAL, Take by mouth., Disp: , Rfl:     tretinoin (Retin-A) 0.1 % cream, Apply topically once daily at bedtime., Disp: , Rfl:     busPIRone (Buspar) 5 mg tablet, Take 1 tablet (5 mg) by mouth 2 times a day., Disp: 180 tablet, Rfl: 1    ketoconazole (NIZOral) 2 % cream, Apply twice daily to affected areas of face, Disp: 60 g, Rfl: 11    LORazepam (Ativan) 1 mg tablet, Take 0.5 tablets (0.5 mg) by mouth 2 times a day as needed for anxiety. Do not fill before November 22, 2024., Disp: 30 tablet, Rfl: 2    mirtazapine (Remeron) 7.5 mg tablet, Take 1 tablet (7.5 mg) by mouth once daily  at bedtime., Disp: 90 tablet, Rfl: 1    spironolactone (Aldactone) 50 mg tablet, Take 1 tablet (50 mg) by mouth once daily., Disp: 90 tablet, Rfl: 1

## 2025-05-19 ENCOUNTER — APPOINTMENT (OUTPATIENT)
Dept: INTEGRATIVE MEDICINE | Facility: CLINIC | Age: 75
End: 2025-05-19
Payer: MEDICARE

## 2025-05-19 DIAGNOSIS — M25.552 LEFT HIP PAIN: ICD-10-CM

## 2025-05-19 DIAGNOSIS — M99.05 SEGMENTAL AND SOMATIC DYSFUNCTION OF PELVIC REGION: ICD-10-CM

## 2025-05-19 DIAGNOSIS — M54.2 CERVICALGIA: ICD-10-CM

## 2025-05-19 DIAGNOSIS — M99.03 SEGMENTAL AND SOMATIC DYSFUNCTION OF LUMBAR REGION: Primary | ICD-10-CM

## 2025-05-19 DIAGNOSIS — M54.50 CHRONIC BILATERAL LOW BACK PAIN WITHOUT SCIATICA: ICD-10-CM

## 2025-05-19 DIAGNOSIS — M99.02 SEGMENTAL AND SOMATIC DYSFUNCTION OF THORACIC REGION: ICD-10-CM

## 2025-05-19 DIAGNOSIS — G89.29 CHRONIC BILATERAL LOW BACK PAIN WITHOUT SCIATICA: ICD-10-CM

## 2025-05-19 DIAGNOSIS — M99.04 SEGMENTAL AND SOMATIC DYSFUNCTION OF SACRAL REGION: ICD-10-CM

## 2025-05-19 DIAGNOSIS — M99.01 SEGMENTAL DYSFUNCTION OF CERVICAL REGION: ICD-10-CM

## 2025-05-19 PROCEDURE — 98942 CHIROPRACTIC MANJ 5 REGIONS: CPT | Performed by: CHIROPRACTOR

## 2025-05-19 NOTE — PROGRESS NOTES
Subjective   Patient ID: Taylor Kerns is a 74 y.o. female who presents May 19, 2025 for lower back and neck pain.    MCR    Today, the patient rates their degree of pain as a 7 out of 10 on the numeric pain rating scale.     HPI : Taylor returns to my office with main complaint of lower back, hip and neck pain. She recently returned home last week after months in [facility]. While there, she dealt with ongoing left hip and lower back pain worsened by any prolonged standing and walking. Reports she can walk about 5 minutes before discomfort sets in. States she received injections in the back and hip, most recently 1+ month ago, with mild relief in symptoms. Notes upper back and neck tension. Denies new trauma/incident.       Objective   Physical Exam  Neurological:      General: No focal deficit present.      Mental Status: She is alert and oriented to person, place, and time.      Cranial Nerves: No dysarthria or facial asymmetry.      Sensory: Sensation is intact.      Motor: Motor function is intact.      Coordination: Coordination is intact.      Gait: Gait is intact. Gait normal.       Palpation of the following region(s) revealed:  Cervical: Upper trapezius right, muscular hypertonicity.  Cervical paraspinals right, hypertonicity and tenderness.  Thoracic: Thoracic paraspinals right, muscular hypertonicity.  Middle trapezius right, muscular hypertonicity.  Pectoralis right, muscular hypertonicity.  Lumbar: Lumbar paraspinals bilateral, hypertonicity and tenderness.  Quadratus lumborum bilateral, hypertonicity and tenderness and muscular hypertonicity.  Gluteal left, hypertonicity and tenderness.  Psoas left, hypertonicity and tenderness.        Segmental Joint(s): Segmental joint dysfunction was assessed with motion palpation and is identified in the following areas:  Cervical : C4 C5  Thoracic : T3, T4, T7, and T8  Lumbopelvic / Sacral SIJ : L4, L5/S1, R SIJ, and L SIJ      Assessment/Plan   Today's  Treatment Included: Chiropractic manipulation to the Segmental Joint(s) Cervical : C4 C5 Segmental Joint(s) Lumbopelvic/Sacral SIJ : L4, L5/S1, R SIJ, and L SIJ Segmental Joint(s) Thoracic : T3, T4, T7, and T8   Treatment Techniques Used : Direct Non-force technique, Activator/Tool assisted technique, Pelvic blocking technique, and Low force    Supine psoas release L; IC to L glute    Soft-tissue mobilization was performed in the following areas:   Cervical paraspinal mm. bilateral and Upper Trapezius bilateral  Thoracic Paraspinal mm. bilateral, Rhomboids bilateral, and Pectoralis bilateral  Lumbar Paraspinal mm. bilateral, Quadratus Lumborum bilateral, Gluteal mm. Glute. Med. bilateral, and Psoas bilateral    F/U as needed and beneficial   The patient tolerated today's treatment with little or no additional discomfort and was instructed to contact the office for questions or concerns.

## 2025-05-28 ENCOUNTER — APPOINTMENT (OUTPATIENT)
Dept: INTEGRATIVE MEDICINE | Facility: CLINIC | Age: 75
End: 2025-05-28
Payer: MEDICARE

## 2025-05-28 DIAGNOSIS — M54.50 CHRONIC BILATERAL LOW BACK PAIN WITHOUT SCIATICA: ICD-10-CM

## 2025-05-28 DIAGNOSIS — M25.552 LEFT HIP PAIN: ICD-10-CM

## 2025-05-28 DIAGNOSIS — M54.2 CERVICALGIA: ICD-10-CM

## 2025-05-28 DIAGNOSIS — M99.05 SEGMENTAL AND SOMATIC DYSFUNCTION OF PELVIC REGION: ICD-10-CM

## 2025-05-28 DIAGNOSIS — M99.04 SEGMENTAL AND SOMATIC DYSFUNCTION OF SACRAL REGION: ICD-10-CM

## 2025-05-28 DIAGNOSIS — M99.01 SEGMENTAL DYSFUNCTION OF CERVICAL REGION: ICD-10-CM

## 2025-05-28 DIAGNOSIS — M99.02 SEGMENTAL AND SOMATIC DYSFUNCTION OF THORACIC REGION: ICD-10-CM

## 2025-05-28 DIAGNOSIS — M99.03 SEGMENTAL AND SOMATIC DYSFUNCTION OF LUMBAR REGION: Primary | ICD-10-CM

## 2025-05-28 DIAGNOSIS — G89.29 CHRONIC BILATERAL LOW BACK PAIN WITHOUT SCIATICA: ICD-10-CM

## 2025-05-28 PROCEDURE — 98941 CHIROPRACT MANJ 3-4 REGIONS: CPT | Performed by: CHIROPRACTOR

## 2025-05-28 NOTE — PROGRESS NOTES
Subjective   Patient ID: Taylor Kenrs is a 74 y.o. female who presents May 28, 2025 for lower back and neck pain.    MCR    Today, the patient rates their degree of pain as a 7 out of 10 on the numeric pain rating scale.     HPI : Taylor returns to my office with main complaint of lower back, hip and neck pain. She started a new medication for anxiety about 3 days ago but has been experiencing side effects including jaw tension and clenching. Notes this has created increased tension in the neck. Left hip pain remains present, particularly with prolonged walking. She has continued working out at [employer] and recently joined a new [employer]. Denies new trauma/incident.       Objective   Physical Exam  Neurological:      General: No focal deficit present.      Mental Status: She is alert and oriented to person, place, and time.      Cranial Nerves: No dysarthria or facial asymmetry.      Sensory: Sensation is intact.      Motor: Motor function is intact.      Coordination: Coordination is intact.      Gait: Gait is intact. Gait normal.       Palpation of the following region(s) revealed:  Cervical: Upper trapezius right, muscular hypertonicity.  Cervical paraspinals right, hypertonicity and tenderness.  Thoracic: Thoracic paraspinals right, muscular hypertonicity.  Middle trapezius right, muscular hypertonicity.  Pectoralis right, muscular hypertonicity.  Lumbar: Lumbar paraspinals bilateral, hypertonicity and tenderness.  Quadratus lumborum bilateral, hypertonicity and tenderness and muscular hypertonicity.  Gluteal left, hypertonicity and tenderness.  Psoas left, hypertonicity and tenderness.        Segmental Joint(s): Segmental joint dysfunction was assessed with motion palpation and is identified in the following areas:  Cervical : C1 C5  Thoracic : T3, T4, T7, and T8  Lumbopelvic / Sacral SIJ : L4, L5/S1, R SIJ, and L SIJ      Assessment/Plan   Today's Treatment Included: Chiropractic manipulation to the  Segmental Joint(s) Cervical : C0 C5 Segmental Joint(s) Lumbopelvic/Sacral SIJ : L4, L5/S1, R SIJ, and L SIJ Segmental Joint(s) Thoracic : T3, T4, T7, and T8   Treatment Techniques Used : Direct Non-force technique, Activator/Tool assisted technique, Pelvic blocking technique, and Low force    IC to L glute    Soft-tissue mobilization was performed in the following areas:   Cervical paraspinal mm. bilateral and Upper Trapezius bilateral  Thoracic Paraspinal mm. bilateral, Rhomboids bilateral, and Pectoralis bilateral  Lumbar Paraspinal mm. bilateral, Quadratus Lumborum bilateral, Gluteal mm. Glute. Med. bilateral, and Psoas bilateral    F/U as needed and beneficial     The patient tolerated today's treatment with little or no additional discomfort and was instructed to contact the office for questions or concerns.

## 2025-06-04 ENCOUNTER — APPOINTMENT (OUTPATIENT)
Dept: INTEGRATIVE MEDICINE | Facility: CLINIC | Age: 75
End: 2025-06-04
Payer: MEDICARE

## 2025-06-04 DIAGNOSIS — F41.9 ANXIETY: ICD-10-CM

## 2025-06-04 DIAGNOSIS — M25.552 LEFT HIP PAIN: ICD-10-CM

## 2025-06-04 DIAGNOSIS — M54.59 OTHER LOW BACK PAIN: Primary | ICD-10-CM

## 2025-06-04 PROCEDURE — 97811 ACUP 1/> W/O ESTIM EA ADD 15: CPT | Performed by: INTERNAL MEDICINE

## 2025-06-04 PROCEDURE — 97810 ACUP 1/> WO ESTIM 1ST 15 MIN: CPT | Performed by: INTERNAL MEDICINE

## 2025-06-04 NOTE — PROGRESS NOTES
Acupuncture Visit:     Subjective   Patient ID: Taylor Kerns is a 74 y.o. female who presents for No chief complaint on file.  2025 Acupuncture Benefits   05/28/2025 RA   1.Medicare A & B # [id number]   Limitations (Visits, etc.): 12 visits in 90 days + add 8 days if improve   Supervising Medicare Provider:   Covered Diagnosis: Chronic low back pain   Supervising Medicare Provider:  Jason   Ref #: Availity Transaction ID [id number]     Pt. Needs a pager for tx  [facility]     Other low back pain:  Mostly in the morning  Disc issues in Lumbar area    L5/S1 - most compromised area, most painful area   Fairly active, pain subsides after she does some activity (piliates, yoga, swim)  3-4/10 lower back pain daily  Pain is worse in the morning, does yoga or piliates and pain improves, pain worse again at night  Bending forward aggravates her pain  Uses heat, feels good  Takes meloxicam as needed, notes  L hip pain daily as well as LBP  5-6/10   L hip pain radiates down to toes at times   Gets chiro care, HX of PT    Previous (2023)  States she has been in more pain due to not coming in for regular acupuncture and chiropractic,  Also in crease pain as she has not been sleeping well, weaning off antidepressant.  States she will be going to [place] in 2 weeks for the winter.        Session Information  Is this acupuncture treatment being billed to the patient's insurance company: Yes  This is visit number: 1  The patient has a total number of visits of: 12  Initial Acupuncture Treatment date: 08/21/23  Name of Supervising Physician: jason         Review of Systems         Provider reviewed plan for the acupuncture session, precautions and contraindications. Patient/guardian/hospital staff has given consent to treat with full understanding of what to expect during the session. Before acupuncture began, provider explained to the patient to communicate at any time if the procedure was causing discomfort past  their tolerance level. Patient agreed to advise acupuncturist. The acupuncturist counseled the patient on the risks of acupuncture treatment including pain, infection, bleeding, and no relief of pain. The patient was positioned comfortably. There was no evidence of infection at the site of needle insertions.    Objective   Physical Exam              Acupuncture Treatment  Patient Position: Supine on a table  Acupuncture Needling: Yes  Needle Guage: 42 guage /.14/ Lime green seirin  Body Points: With retention  Body Points - Left: LI4/BL62 - 88.12 88.13 88.14 - 77.17 77.18 77.19 77.21  Body Points - Bilateral: DU20 LU9 HT7 GB34 KI6 SP6  Body Points - Right: LR3/SI3  Other Techniques Utilized: TDP Lamp  TDP Lamp Descripton: feet  Needle Count In: 22  Needle Count Out: 22  Needle Retention Time (min): 30 minutes  Total Face to Face Time (min): 30 minutes              Assessment/Plan

## 2025-06-09 ENCOUNTER — APPOINTMENT (OUTPATIENT)
Dept: INTEGRATIVE MEDICINE | Facility: CLINIC | Age: 75
End: 2025-06-09
Payer: MEDICARE

## 2025-06-09 DIAGNOSIS — G89.29 CHRONIC BILATERAL LOW BACK PAIN WITHOUT SCIATICA: ICD-10-CM

## 2025-06-09 DIAGNOSIS — M54.50 CHRONIC BILATERAL LOW BACK PAIN WITHOUT SCIATICA: ICD-10-CM

## 2025-06-09 DIAGNOSIS — M99.02 SEGMENTAL AND SOMATIC DYSFUNCTION OF THORACIC REGION: ICD-10-CM

## 2025-06-09 DIAGNOSIS — M25.552 LEFT HIP PAIN: ICD-10-CM

## 2025-06-09 DIAGNOSIS — M99.01 SEGMENTAL DYSFUNCTION OF CERVICAL REGION: ICD-10-CM

## 2025-06-09 DIAGNOSIS — M99.04 SEGMENTAL AND SOMATIC DYSFUNCTION OF SACRAL REGION: ICD-10-CM

## 2025-06-09 DIAGNOSIS — M54.2 CERVICALGIA: ICD-10-CM

## 2025-06-09 DIAGNOSIS — M99.05 SEGMENTAL AND SOMATIC DYSFUNCTION OF PELVIC REGION: ICD-10-CM

## 2025-06-09 DIAGNOSIS — M99.03 SEGMENTAL AND SOMATIC DYSFUNCTION OF LUMBAR REGION: Primary | ICD-10-CM

## 2025-06-09 PROCEDURE — 98942 CHIROPRACTIC MANJ 5 REGIONS: CPT | Performed by: CHIROPRACTOR

## 2025-06-09 NOTE — PROGRESS NOTES
Subjective   Patient ID: Taylor Kerns is a 74 y.o. female who presents June 9, 2025 for lower back and neck pain.    MCR    Today, the patient rates their degree of pain as a 7 out of 10 on the numeric pain rating scale.     HPI : Taylor returns to my office with main complaint of lower back, hip and neck pain. She was standing at an outdoor birthday party yesterday and notes increased lower back stiffness upon waking this morning. Notes tightness and discomfort in the neck and upper trapezius regions. Left hip remains bothersome, particularly with prolonged walking. Denies new trauma/incident.       Objective   Physical Exam  Neurological:      General: No focal deficit present.      Mental Status: She is alert and oriented to person, place, and time.      Cranial Nerves: No dysarthria or facial asymmetry.      Sensory: Sensation is intact.      Motor: Motor function is intact.      Coordination: Coordination is intact.      Gait: Gait is intact. Gait normal.       Palpation of the following region(s) revealed:  Cervical: Upper trapezius right, muscular hypertonicity.  Cervical paraspinals right, hypertonicity and tenderness.  Thoracic: Thoracic paraspinals right, muscular hypertonicity.  Middle trapezius right, muscular hypertonicity.  Pectoralis right, muscular hypertonicity.  Lumbar: Lumbar paraspinals bilateral, hypertonicity and tenderness.  Quadratus lumborum bilateral, hypertonicity and tenderness and muscular hypertonicity.  Gluteal left, hypertonicity and tenderness.  Psoas left, hypertonicity and tenderness.        Segmental Joint(s): Segmental joint dysfunction was assessed with motion palpation and is identified in the following areas:  Cervical : C1 C5  Thoracic : T3, T4, T7, and T8  Lumbopelvic / Sacral SIJ : L4, L5/S1, R SIJ, and L SIJ      Assessment/Plan   Today's Treatment Included: Chiropractic manipulation to the Segmental Joint(s) Cervical : C0 C5 Segmental Joint(s) Lumbopelvic/Sacral SIJ  : L4, L5/S1, R SIJ, and L SIJ Segmental Joint(s) Thoracic : T3, T4, T7, and T8   Treatment Techniques Used : Direct Non-force technique, Activator/Tool assisted technique, Pelvic blocking technique, and Low force    Soft-tissue mobilization was performed in the following areas:   Cervical paraspinal mm. bilateral and Upper Trapezius bilateral  Thoracic Paraspinal mm. bilateral, Rhomboids bilateral, and Pectoralis bilateral  Lumbar Paraspinal mm. bilateral, Quadratus Lumborum bilateral, Gluteal mm. Glute. Med. bilateral, and Psoas bilateral    F/U as needed and beneficial     The patient tolerated today's treatment with little or no additional discomfort and was instructed to contact the office for questions or concerns.

## 2025-06-16 ENCOUNTER — APPOINTMENT (OUTPATIENT)
Dept: DERMATOLOGY | Facility: CLINIC | Age: 75
End: 2025-06-16
Payer: MEDICARE

## 2025-06-26 ENCOUNTER — APPOINTMENT (OUTPATIENT)
Dept: INTEGRATIVE MEDICINE | Facility: CLINIC | Age: 75
End: 2025-06-26
Payer: MEDICARE

## 2025-06-26 DIAGNOSIS — M25.552 LEFT HIP PAIN: ICD-10-CM

## 2025-06-26 DIAGNOSIS — M54.59 OTHER LOW BACK PAIN: Primary | ICD-10-CM

## 2025-06-26 DIAGNOSIS — F41.9 ANXIETY: ICD-10-CM

## 2025-06-26 PROCEDURE — 97810 ACUP 1/> WO ESTIM 1ST 15 MIN: CPT | Performed by: INTERNAL MEDICINE

## 2025-06-26 PROCEDURE — 97811 ACUP 1/> W/O ESTIM EA ADD 15: CPT | Performed by: INTERNAL MEDICINE

## 2025-06-26 NOTE — PROGRESS NOTES
Acupuncture Visit:     Subjective   Patient ID: Taylor Kerns is a 74 y.o. female who presents for No chief complaint on file.  2025 Acupuncture Benefits   05/28/2025 RA   1.Medicare A & B # [id number]   Limitations (Visits, etc.): 12 visits in 90 days + add 8 days if improve   Supervising Medicare Provider:   Covered Diagnosis: Chronic low back pain   Supervising Medicare Provider:  Jason   Ref #: Availity Transaction ID [id number]     Pt. Needs a pager for tx  [facility]       Lower back pain is on and off, back is sore in the AM, getting better as the day goes on  L hip pain has improved overall since her acu tx    Other low back pain:  Mostly in the morning  Disc issues in Lumbar area    L5/S1 - most compromised area, most painful area   Fairly active, pain subsides after she does some activity (piliates, yoga, swim)  3-4/10 lower back pain daily  Pain is worse in the morning, does yoga or piliates and pain improves, pain worse again at night  Bending forward aggravates her pain  Uses heat, feels good  Takes meloxicam as needed, notes  L hip pain daily as well as LBP  5-6/10   L hip pain radiates down to toes at times   Gets chiro care, HX of PT    Previous (2023)  States she has been in more pain due to not coming in for regular acupuncture and chiropractic,  Also in crease pain as she has not been sleeping well, weaning off antidepressant.  States she will be going to [place] in 2 weeks for the winter.        Session Information  Is this acupuncture treatment being billed to the patient's insurance company: Yes  This is visit number: 2  The patient has a total number of visits of: 12  Initial Acupuncture Treatment date: 08/21/23  Name of Supervising Physician: jason         Review of Systems         Provider reviewed plan for the acupuncture session, precautions and contraindications. Patient/guardian/hospital staff has given consent to treat with full understanding of what to expect during the  session. Before acupuncture began, provider explained to the patient to communicate at any time if the procedure was causing discomfort past their tolerance level. Patient agreed to advise acupuncturist. The acupuncturist counseled the patient on the risks of acupuncture treatment including pain, infection, bleeding, and no relief of pain. The patient was positioned comfortably. There was no evidence of infection at the site of needle insertions.    Objective   Physical Exam              Acupuncture Treatment  Patient Position: Supine on a table  Acupuncture Needling: Yes  Needle Guage: 42 guage /.14/ Lime green irin  Body Points: With retention  Body Points - Left: SI3 - 88.12 88.13 88.14  Body Points - Bilateral: DU20 HT7 KI6 SP6 LR3  Body Points - Right: BL62 - LI4 - 88.09 88.10 88.11  Other Techniques Utilized: TDP Lamp  TDP Lamp Descripton: feet (no moxa)  Needle Count In: 18  Needle Count Out: 18  Needle Retention Time (min): 30 minutes  Total Face to Face Time (min): 25 minutes              Assessment/Plan

## 2025-06-30 ENCOUNTER — APPOINTMENT (OUTPATIENT)
Dept: INTEGRATIVE MEDICINE | Facility: CLINIC | Age: 75
End: 2025-06-30
Payer: MEDICARE

## 2025-06-30 DIAGNOSIS — M99.02 SEGMENTAL AND SOMATIC DYSFUNCTION OF THORACIC REGION: ICD-10-CM

## 2025-06-30 DIAGNOSIS — M54.50 CHRONIC BILATERAL LOW BACK PAIN WITHOUT SCIATICA: ICD-10-CM

## 2025-06-30 DIAGNOSIS — M79.18 MYALGIA, OTHER SITE: ICD-10-CM

## 2025-06-30 DIAGNOSIS — M99.05 SEGMENTAL AND SOMATIC DYSFUNCTION OF PELVIC REGION: ICD-10-CM

## 2025-06-30 DIAGNOSIS — G89.29 CHRONIC BILATERAL LOW BACK PAIN WITHOUT SCIATICA: ICD-10-CM

## 2025-06-30 DIAGNOSIS — M54.2 CERVICALGIA: ICD-10-CM

## 2025-06-30 DIAGNOSIS — M99.03 SEGMENTAL AND SOMATIC DYSFUNCTION OF LUMBAR REGION: Primary | ICD-10-CM

## 2025-06-30 PROCEDURE — 98941 CHIROPRACT MANJ 3-4 REGIONS: CPT | Performed by: CHIROPRACTOR

## 2025-06-30 NOTE — PROGRESS NOTES
Subjective   Patient ID: Taylor Kerns is a 74 y.o. female who presents June 30, 2025 for lower back and neck pain.    MCR    Today, the patient rates their degree of pain as a 8 out of 10 on the numeric pain rating scale.     HPI : Taylor returns to my office with main complaint of lower back, hip and neck pain. She is quite sore today that began over the weekend. She wonders if it might be related to swimming more frequently. She is feeling soreness and pain throughout the low back, mid back and neck. Denies new trauma/incident.       Objective   Physical Exam  Neurological:      General: No focal deficit present.      Mental Status: She is alert and oriented to person, place, and time.      Cranial Nerves: No dysarthria or facial asymmetry.      Sensory: Sensation is intact.      Motor: Motor function is intact.      Coordination: Coordination is intact.      Gait: Gait is intact. Gait normal.       Palpation of the following region(s) revealed:  Cervical: Upper trapezius right, muscular hypertonicity.  Cervical paraspinals right, hypertonicity and tenderness.  Thoracic: Thoracic paraspinals right, muscular hypertonicity.  Middle trapezius right, muscular hypertonicity.  Pectoralis right, muscular hypertonicity.  Lumbar: Lumbar paraspinals bilateral, hypertonicity and tenderness.  Quadratus lumborum bilateral, hypertonicity and tenderness and muscular hypertonicity.  Gluteal left, hypertonicity and tenderness.  Psoas left, hypertonicity and tenderness.        Segmental Joint(s): Segmental joint dysfunction was assessed with motion palpation and is identified in the following areas:  Cervical : C2 C5  Thoracic : T2., T3, T4, T7, and T8  Lumbopelvic / Sacral SIJ : L4, L5/S1, R SIJ, and L SIJ      Assessment/Plan   Today's Treatment Included: Chiropractic manipulation to the Segmental Joint(s) Cervical : C2 C5 Segmental Joint(s) Lumbopelvic/Sacral SIJ : L4, L5/S1, R SIJ, and L SIJ Segmental Joint(s) Thoracic :  T2., T3, T4, T7, and T8   Treatment Techniques Used : Direct Non-force technique, Activator/Tool assisted technique, Pelvic blocking technique, and Low force    Soft-tissue mobilization was performed in the following areas:   Cervical paraspinal mm. bilateral and Upper Trapezius bilateral  Thoracic Paraspinal mm. bilateral, Rhomboids bilateral, and Pectoralis bilateral  Lumbar Paraspinal mm. bilateral, Quadratus Lumborum bilateral, Gluteal mm. Glute. Med. bilateral, and Psoas bilateral    F/U as needed and beneficial     The patient tolerated today's treatment with little or no additional discomfort and was instructed to contact the office for questions or concerns.

## 2025-07-10 ENCOUNTER — APPOINTMENT (OUTPATIENT)
Dept: BEHAVIORAL HEALTH | Facility: CLINIC | Age: 75
End: 2025-07-10
Payer: MEDICARE

## 2025-07-14 ENCOUNTER — ALLIED HEALTH (OUTPATIENT)
Dept: INTEGRATIVE MEDICINE | Facility: CLINIC | Age: 75
End: 2025-07-14
Payer: MEDICARE

## 2025-07-14 ENCOUNTER — APPOINTMENT (OUTPATIENT)
Dept: INTEGRATIVE MEDICINE | Facility: CLINIC | Age: 75
End: 2025-07-14
Payer: MEDICARE

## 2025-07-14 DIAGNOSIS — M79.18 MYALGIA, OTHER SITE: ICD-10-CM

## 2025-07-14 DIAGNOSIS — G89.29 CHRONIC BILATERAL LOW BACK PAIN WITHOUT SCIATICA: Primary | ICD-10-CM

## 2025-07-14 DIAGNOSIS — M99.03 SEGMENTAL AND SOMATIC DYSFUNCTION OF LUMBAR REGION: ICD-10-CM

## 2025-07-14 DIAGNOSIS — M99.02 SEGMENTAL AND SOMATIC DYSFUNCTION OF THORACIC REGION: ICD-10-CM

## 2025-07-14 DIAGNOSIS — M25.552 LEFT HIP PAIN: ICD-10-CM

## 2025-07-14 DIAGNOSIS — M54.2 CERVICALGIA: ICD-10-CM

## 2025-07-14 DIAGNOSIS — M99.05 SEGMENTAL AND SOMATIC DYSFUNCTION OF PELVIC REGION: ICD-10-CM

## 2025-07-14 DIAGNOSIS — M54.59 OTHER LOW BACK PAIN: Primary | ICD-10-CM

## 2025-07-14 DIAGNOSIS — M54.50 CHRONIC BILATERAL LOW BACK PAIN WITHOUT SCIATICA: Primary | ICD-10-CM

## 2025-07-14 PROCEDURE — 97811 ACUP 1/> W/O ESTIM EA ADD 15: CPT | Performed by: INTERNAL MEDICINE

## 2025-07-14 PROCEDURE — 98941 CHIROPRACT MANJ 3-4 REGIONS: CPT | Performed by: CHIROPRACTOR

## 2025-07-14 PROCEDURE — 97810 ACUP 1/> WO ESTIM 1ST 15 MIN: CPT | Performed by: INTERNAL MEDICINE

## 2025-07-14 NOTE — PROGRESS NOTES
Subjective   Patient ID: Taylor Kerns is a 74 y.o. female who presents July 14, 2025 for lower back and neck pain.    MCR    Today, the patient rates their degree of pain as a 7 out of 10 on the numeric pain rating scale.     HPI : Taylor arrives to my office with main complaint of lower back, hip and neck pain. She is feeling generalized soreness throughout the neck and back. She has continued with regular exercise including exercise, Pilates and swimming. Denies new trauma/incident.       Objective   Physical Exam  Neurological:      General: No focal deficit present.      Mental Status: She is alert and oriented to person, place, and time.      Cranial Nerves: No dysarthria or facial asymmetry.      Sensory: Sensation is intact.      Motor: Motor function is intact.      Coordination: Coordination is intact.      Gait: Gait is intact. Gait normal.       Palpation of the following region(s) revealed:  Cervical: Upper trapezius right, muscular hypertonicity.  Cervical paraspinals right, hypertonicity and tenderness.  Thoracic: Thoracic paraspinals right, muscular hypertonicity.  Middle trapezius right, muscular hypertonicity.  Pectoralis right, muscular hypertonicity.  Lumbar: Lumbar paraspinals bilateral, hypertonicity and tenderness.  Quadratus lumborum bilateral, muscular hypertonicity.  Gluteal left, hypertonicity and tenderness.  Psoas left, muscular hypertonicity.        Segmental Joint(s): Segmental joint dysfunction was assessed with motion palpation and is identified in the following areas:  Cervical : C5  Thoracic : T3, T4, T7, T8, and T12  Lumbopelvic / Sacral SIJ : L1, L4, L5/S1, R SIJ, and L SIJ      Assessment/Plan   Today's Treatment Included: Chiropractic manipulation to the Segmental Joint(s) Cervical : C5 Segmental Joint(s) Lumbopelvic/Sacral SIJ : L1, L4, L5/S1, R SIJ, and L SIJ Segmental Joint(s) Thoracic : T3, T4, T7, T8, and T12   Treatment Techniques Used : Direct Non-force technique,  Activator/Tool assisted technique, Pelvic blocking technique, and Low force    Soft-tissue mobilization was performed in the following areas:   Cervical paraspinal mm. bilateral and Upper Trapezius bilateral  Thoracic Paraspinal mm. bilateral, Rhomboids bilateral, and Pectoralis bilateral  Lumbar Paraspinal mm. bilateral, Quadratus Lumborum bilateral, Gluteal mm. Glute. Med. bilateral, and Psoas bilateral    F/U as needed and beneficial     The patient tolerated today's treatment with little or no additional discomfort and was instructed to contact the office for questions or concerns.

## 2025-07-14 NOTE — PROGRESS NOTES
Acupuncture Visit:     Subjective   Patient ID: Taylor Kerns is a 74 y.o. female who presents for No chief complaint on file.  2025 Acupuncture Benefits   05/28/2025 RA   1.Medicare A & B # [id number]   Limitations (Visits, etc.): 12 visits in 90 days + add 8 days if improve   Supervising Medicare Provider:   Covered Diagnosis: Chronic low back pain   Supervising Medicare Provider:  Jason   Ref #: Availity Transaction ID [id number]     Pager and [facility] remedy   Everyday is different, some days she feels sore  After acupuncture - she feels better   L hip pain is goes up and down - overall better   Pain is worse in the AM    prev  Pt. Needs a pager for tx  [facility]     Lower back pain is on and off, back is sore in the AM, getting better as the day goes on  L hip pain has improved overall since her acu tx    Other low back pain:  Mostly in the morning  Disc issues in Lumbar area    L5/S1 - most compromised area, most painful area   Fairly active, pain subsides after she does some activity (piliates, yoga, swim)  3-4/10 lower back pain daily  Pain is worse in the morning, does yoga or piliates and pain improves, pain worse again at night  Bending forward aggravates her pain  Uses heat, feels good  Takes meloxicam as needed, notes  L hip pain daily as well as LBP  5-6/10   L hip pain radiates down to toes at times   Gets chiro care, HX of PT    Previous (2023)  States she has been in more pain due to not coming in for regular acupuncture and chiropractic,  Also in crease pain as she has not been sleeping well, weaning off antidepressant.  States she will be going to [place] in 2 weeks for the winter.        Session Information  Is this acupuncture treatment being billed to the patient's insurance company: Yes  This is visit number: 3  The patient has a total number of visits of: 12  Initial Acupuncture Treatment date: 08/21/23  Name of Supervising Physician: jason  Visit Type: Follow-up  visit         Review of Systems         Provider reviewed plan for the acupuncture session, precautions and contraindications. Patient/guardian/hospital staff has given consent to treat with full understanding of what to expect during the session. Before acupuncture began, provider explained to the patient to communicate at any time if the procedure was causing discomfort past their tolerance level. Patient agreed to advise acupuncturist. The acupuncturist counseled the patient on the risks of acupuncture treatment including pain, infection, bleeding, and no relief of pain. The patient was positioned comfortably. There was no evidence of infection at the site of needle insertions.    Objective   Physical Exam              Acupuncture Treatment  Patient Position: Supine on a table  Acupuncture Needling: Yes  Needle Guage: 42 guage /.14/ Lime green seirin  Body Points: With retention  Body Points - Left: 88.12 88.13 88.14  Body Points - Bilateral: DU20 HT7 KI6 SP6 LR3 GB34 GB40  Body Points - Right: 88.09 88.10 88.11  Other Techniques Utilized: TDP Lamp  TDP Lamp Descripton: feet (no moxa)  Needle Count In: 19  Needle Count Out: 19              Assessment/Plan

## 2025-07-16 DIAGNOSIS — R73.9 BORDERLINE HYPERGLYCEMIA: ICD-10-CM

## 2025-07-16 DIAGNOSIS — F41.1 GENERALIZED ANXIETY DISORDER: ICD-10-CM

## 2025-07-16 DIAGNOSIS — R79.89 ABNORMAL TSH: ICD-10-CM

## 2025-07-16 DIAGNOSIS — E03.8 SUBCLINICAL HYPOTHYROIDISM: ICD-10-CM

## 2025-07-16 DIAGNOSIS — E55.9 VITAMIN D DEFICIENCY: ICD-10-CM

## 2025-07-16 DIAGNOSIS — R73.03 PREDIABETES: ICD-10-CM

## 2025-07-16 DIAGNOSIS — L65.8 FEMALE PATTERN HAIR LOSS: ICD-10-CM

## 2025-07-16 DIAGNOSIS — F32.9 TREATMENT-RESISTANT DEPRESSION: ICD-10-CM

## 2025-07-16 DIAGNOSIS — E78.5 DYSLIPIDEMIA: Primary | ICD-10-CM

## 2025-07-26 LAB
25(OH)D3+25(OH)D2 SERPL-MCNC: 62 NG/ML (ref 30–100)
ALBUMIN SERPL-MCNC: 4.1 G/DL (ref 3.6–5.1)
ALP SERPL-CCNC: 71 U/L (ref 37–153)
ALT SERPL-CCNC: 12 U/L (ref 6–29)
ANION GAP SERPL CALCULATED.4IONS-SCNC: 7 MMOL/L (CALC) (ref 7–17)
AST SERPL-CCNC: 17 U/L (ref 10–35)
BASOPHILS # BLD AUTO: 78 CELLS/UL (ref 0–200)
BASOPHILS NFR BLD AUTO: 1.3 %
BILIRUB SERPL-MCNC: 0.8 MG/DL (ref 0.2–1.2)
BUN SERPL-MCNC: 19 MG/DL (ref 7–25)
CALCIUM SERPL-MCNC: 9.2 MG/DL (ref 8.6–10.4)
CHLORIDE SERPL-SCNC: 106 MMOL/L (ref 98–110)
CHOLEST SERPL-MCNC: 251 MG/DL
CHOLEST/HDLC SERPL: 2.9 (CALC)
CO2 SERPL-SCNC: 30 MMOL/L (ref 20–32)
CREAT SERPL-MCNC: 0.71 MG/DL (ref 0.6–1)
EGFRCR SERPLBLD CKD-EPI 2021: 90 ML/MIN/1.73M2
EOSINOPHIL # BLD AUTO: 120 CELLS/UL (ref 15–500)
EOSINOPHIL NFR BLD AUTO: 2 %
ERYTHROCYTE [DISTWIDTH] IN BLOOD BY AUTOMATED COUNT: 12.3 % (ref 11–15)
EST. AVERAGE GLUCOSE BLD GHB EST-MCNC: 123 MG/DL
EST. AVERAGE GLUCOSE BLD GHB EST-SCNC: 6.8 MMOL/L
GLUCOSE SERPL-MCNC: 99 MG/DL (ref 65–99)
HBA1C MFR BLD: 5.9 %
HCT VFR BLD AUTO: 41.5 % (ref 35–45)
HDLC SERPL-MCNC: 87 MG/DL
HGB BLD-MCNC: 13.4 G/DL (ref 11.7–15.5)
LDLC SERPL CALC-MCNC: 145 MG/DL (CALC)
LYMPHOCYTES # BLD AUTO: 2244 CELLS/UL (ref 850–3900)
LYMPHOCYTES NFR BLD AUTO: 37.4 %
MAGNESIUM SERPL-MCNC: 2.5 MG/DL (ref 1.5–2.5)
MCH RBC QN AUTO: 32.1 PG (ref 27–33)
MCHC RBC AUTO-ENTMCNC: 32.3 G/DL (ref 32–36)
MCV RBC AUTO: 99.3 FL (ref 80–100)
MONOCYTES # BLD AUTO: 396 CELLS/UL (ref 200–950)
MONOCYTES NFR BLD AUTO: 6.6 %
NEUTROPHILS # BLD AUTO: 3162 CELLS/UL (ref 1500–7800)
NEUTROPHILS NFR BLD AUTO: 52.7 %
NONHDLC SERPL-MCNC: 164 MG/DL (CALC)
PHOSPHATE SERPL-MCNC: 3.9 MG/DL (ref 2.1–4.3)
PLATELET # BLD AUTO: 298 THOUSAND/UL (ref 140–400)
PMV BLD REES-ECKER: 9.8 FL (ref 7.5–12.5)
POTASSIUM SERPL-SCNC: 4.1 MMOL/L (ref 3.5–5.3)
PROT SERPL-MCNC: 6.3 G/DL (ref 6.1–8.1)
RBC # BLD AUTO: 4.18 MILLION/UL (ref 3.8–5.1)
SODIUM SERPL-SCNC: 143 MMOL/L (ref 135–146)
TRIGL SERPL-MCNC: 87 MG/DL
TSH SERPL-ACNC: 3.72 MIU/L (ref 0.4–4.5)
WBC # BLD AUTO: 6 THOUSAND/UL (ref 3.8–10.8)

## 2025-07-30 DIAGNOSIS — D75.89 MACROCYTOSIS: Primary | ICD-10-CM

## 2025-07-30 DIAGNOSIS — Z78.0 POSTMENOPAUSAL: ICD-10-CM

## 2025-07-30 DIAGNOSIS — G47.00 INSOMNIA, UNSPECIFIED TYPE: ICD-10-CM

## 2025-07-30 DIAGNOSIS — L65.9 HAIR LOSS: ICD-10-CM

## 2025-07-31 LAB
ALBUMIN/CREAT UR: 5 MG/G CREAT
CREAT UR-MCNC: 73 MG/DL (ref 20–275)
FERRITIN SERPL-MCNC: 29 NG/ML (ref 16–288)
IRON SATN MFR SERPL: 26 % (CALC) (ref 16–45)
IRON SERPL-MCNC: 90 MCG/DL (ref 45–160)
MICROALBUMIN UR-MCNC: 0.4 MG/DL
TIBC SERPL-MCNC: 348 MCG/DL (CALC) (ref 250–450)

## 2025-08-04 ENCOUNTER — APPOINTMENT (OUTPATIENT)
Dept: DERMATOLOGY | Facility: CLINIC | Age: 75
End: 2025-08-04
Payer: MEDICARE

## 2025-08-04 DIAGNOSIS — L81.4 LENTIGO: ICD-10-CM

## 2025-08-04 DIAGNOSIS — L85.3 XEROSIS CUTIS: ICD-10-CM

## 2025-08-04 DIAGNOSIS — L30.4 INTERTRIGO: ICD-10-CM

## 2025-08-04 DIAGNOSIS — L57.0 ACTINIC KERATOSIS: Primary | ICD-10-CM

## 2025-08-04 DIAGNOSIS — L57.8 DIFFUSE PHOTODAMAGE OF SKIN: ICD-10-CM

## 2025-08-04 PROCEDURE — 17000 DESTRUCT PREMALG LESION: CPT | Performed by: DERMATOLOGY

## 2025-08-04 PROCEDURE — 99214 OFFICE O/P EST MOD 30 MIN: CPT | Performed by: DERMATOLOGY

## 2025-08-04 PROCEDURE — 1159F MED LIST DOCD IN RCRD: CPT | Performed by: DERMATOLOGY

## 2025-08-04 RX ORDER — TRIAMCINOLONE ACETONIDE 0.25 MG/G
CREAM TOPICAL
Qty: 80 G | Refills: 1 | Status: SHIPPED | OUTPATIENT
Start: 2025-08-04

## 2025-08-04 ASSESSMENT — DERMATOLOGY QUALITY OF LIFE (QOL) ASSESSMENT
RATE HOW EMOTIONALLY BOTHERED YOU ARE BY YOUR SKIN PROBLEM (FOR EXAMPLE, WORRY, EMBARRASSMENT, FRUSTRATION): 0 - NEVER BOTHERED
DATE THE QUALITY-OF-LIFE ASSESSMENT WAS COMPLETED: 67421
ARE THERE EXCLUSIONS OR EXCEPTIONS FOR THE QUALITY OF LIFE ASSESSMENT: NO
RATE HOW BOTHERED YOU ARE BY SYMPTOMS OF YOUR SKIN PROBLEM (EG, ITCHING, STINGING BURNING, HURTING OR SKIN IRRITATION): 0 - NEVER BOTHERED
RATE HOW BOTHERED YOU ARE BY EFFECTS OF YOUR SKIN PROBLEMS ON YOUR ACTIVITIES (EG, GOING OUT, ACCOMPLISHING WHAT YOU WANT, WORK ACTIVITIES OR YOUR RELATIONSHIPS WITH OTHERS): 0 - NEVER BOTHERED
WHAT SINGLE SKIN CONDITION LISTED BELOW IS THE PATIENT ANSWERING THE QUALITY-OF-LIFE ASSESSMENT QUESTIONS ABOUT: NONE OF THE ABOVE

## 2025-08-04 ASSESSMENT — DERMATOLOGY PATIENT ASSESSMENT
DO YOU HAVE ANY NEW OR CHANGING LESIONS: NO
DO YOU HAVE IRREGULAR MENSTRUAL CYCLES: NO
HAVE YOU HAD OR DO YOU HAVE VASCULAR DISEASE: NO
DO YOU USE A TANNING BED: NO
DO YOU USE SUNSCREEN: OCCASIONALLY
HAVE YOU HAD OR DO YOU HAVE A STAPH INFECTION: NO
ARE YOU ON BIRTH CONTROL: NO
ARE YOU TRYING TO GET PREGNANT: NO
ARE YOU AN ORGAN TRANSPLANT RECIPIENT: NO

## 2025-08-04 ASSESSMENT — PATIENT GLOBAL ASSESSMENT (PGA): PATIENT GLOBAL ASSESSMENT: PATIENT GLOBAL ASSESSMENT:  1 - CLEAR

## 2025-08-04 ASSESSMENT — ITCH NUMERIC RATING SCALE: HOW SEVERE IS YOUR ITCHING?: 0

## 2025-08-04 NOTE — Clinical Note
Biopsy-proven Actinic Keratosis -left superior nasal sidewall, present on the deep and peripheral margins.  The pre-cancerous nature of this lesion, its presence on the margin(s), and treatment options were discussed with the patient today.  At this time, I recommend treatment with liquid nitrogen cryotherapy.  The patient expressed understanding, is in agreement with this plan, and wishes to proceed with cryotherapy today.

## 2025-08-04 NOTE — Clinical Note
On the patient's left superior nasal sidewall, there is a pink, well-healed scar at the recent biopsy site with a surrounding rim of erythema, grittiness, and scale

## 2025-08-04 NOTE — Clinical Note
Intertrigo - flare in intergluteal cleft.  The potentially chronic and intermittently flaring nature of this condition, which likely involves colonization with Candidal yeast, and treatment options were discussed extensively with the patient today.  At this time, I recommend combination topical anti-fungal and topical steroid therapy with Ketoconazole 2% and Triamcinolone 0.025% creams, respectively, which the patient was instructed to apply together twice daily to the affected areas of her buttocks (avoid face and groin area) for the next 2 weeks, followed by taper to twice daily on weekends only for persistent areas with use of over-the-counter Zeasorb AF powder once daily for maintenance; the patient may repeat treatment in a similar 2-week burst-and-taper fashion every 6-8 weeks as needed for future flares.  The risks, benefits, and side effects of these medications, including possible skin atrophy with overuse of topical steroids, were discussed.  The patient expressed understanding and is in agreement with this plan.

## 2025-08-04 NOTE — Clinical Note
In the patient's intergluteal cleft, mainly the superior intergluteal cleft, there are red, moist, slightly scaly patches located in skin folds.

## 2025-08-04 NOTE — Clinical Note
History of several nonmelanoma skin cancers, dysplastic nevus, and actinic keratoses and photodamage.  The patient was recently seen in our office for routine follow-up and skin exam on 5/16/25, at which time no evidence of recurrence was noted.  The signs and symptoms of skin cancer were reviewed and the patient was advised to practice sun protection and sun avoidance, use daily sunscreen, and perform regular self skin exams.  I will have the patient return to our office in 6 months from the date of her last visit for routine follow-up and skin exam, and the patient was instructed to call our office should the patient notice any new, changing, symptomatic, or otherwise concerning skin lesions before then.    In addition, the patient wished to discuss possible medical treatment for photoaging.  Thus, at this time, I recommend the patient begin topical retinoid therapy with Tretinoin 0.1% cream at night.  The risks, benefits, and side effects of this medication, including the redness, dryness, and irritation expected with its use, were discussed; the patient was instructed to begin use of Tretinoin 1-2 nights per week and increase to every other night, then every night as tolerated without excessive dryness and irritation.  The patient was also informed this medication will likely be considered cosmetic and thus may not be covered by their insurance company and require out-of-pocket payment.  The patient expressed understanding and is in agreement with this plan.

## 2025-08-06 ENCOUNTER — APPOINTMENT (OUTPATIENT)
Dept: INTEGRATIVE MEDICINE | Facility: CLINIC | Age: 75
End: 2025-08-06
Payer: MEDICARE

## 2025-08-06 DIAGNOSIS — M25.552 LEFT HIP PAIN: ICD-10-CM

## 2025-08-06 DIAGNOSIS — M79.18 MYALGIA, OTHER SITE: ICD-10-CM

## 2025-08-06 DIAGNOSIS — M99.03 SEGMENTAL AND SOMATIC DYSFUNCTION OF LUMBAR REGION: Primary | ICD-10-CM

## 2025-08-06 DIAGNOSIS — M54.50 CHRONIC BILATERAL LOW BACK PAIN WITHOUT SCIATICA: ICD-10-CM

## 2025-08-06 DIAGNOSIS — M99.02 SEGMENTAL AND SOMATIC DYSFUNCTION OF THORACIC REGION: ICD-10-CM

## 2025-08-06 DIAGNOSIS — M54.59 OTHER LOW BACK PAIN: Primary | ICD-10-CM

## 2025-08-06 DIAGNOSIS — M54.2 CERVICALGIA: ICD-10-CM

## 2025-08-06 DIAGNOSIS — G89.29 CHRONIC BILATERAL LOW BACK PAIN WITHOUT SCIATICA: ICD-10-CM

## 2025-08-06 DIAGNOSIS — M99.05 SEGMENTAL AND SOMATIC DYSFUNCTION OF PELVIC REGION: ICD-10-CM

## 2025-08-06 PROCEDURE — 97810 ACUP 1/> WO ESTIM 1ST 15 MIN: CPT | Performed by: INTERNAL MEDICINE

## 2025-08-06 PROCEDURE — 98941 CHIROPRACT MANJ 3-4 REGIONS: CPT | Performed by: CHIROPRACTOR

## 2025-08-06 PROCEDURE — 97811 ACUP 1/> W/O ESTIM EA ADD 15: CPT | Performed by: INTERNAL MEDICINE

## 2025-08-06 NOTE — PROGRESS NOTES
Subjective   Patient ID: Taylor Kerns is a 75 y.o. female who presents August 6, 2025 for lower back and neck pain.    MCR    Today, the patient rates their degree of pain as a 7 out of 10 on the numeric pain rating scale.     HPI : Taylor arrives to my office with main complaint of lower back, hip and neck pain. Last week her lower back was quite painful without inciting trauma/incident. She took a yoga class and this seemed to help loosen the area and reduce pain. She reports increased left hip pain overall, with some extension now into the leg when sleeping. No relief has been sustained thus far despite injections and physical therapy. She continues to walk 2-3 miles when exercising, but with discomfort. Resting helps. Denies new trauma/incident.       Objective   Physical Exam    Neurological:      General: No focal deficit present.      Mental Status: She is alert and oriented to person, place, and time.      Cranial Nerves: No dysarthria or facial asymmetry.      Sensory: Sensation is intact.      Motor: Motor function is intact.      Coordination: Coordination is intact.      Gait: Gait is intact. Gait normal.       Palpation of the following region(s) revealed:  Cervical: Upper trapezius right, muscular hypertonicity.  Cervical paraspinals right, hypertonicity and tenderness.  Thoracic: Thoracic paraspinals right, muscular hypertonicity.  Middle trapezius right, muscular hypertonicity.  Pectoralis right, muscular hypertonicity.  Lumbar: Lumbar paraspinals bilateral, hypertonicity and tenderness.  Quadratus lumborum bilateral, muscular hypertonicity.  Gluteal left, hypertonicity and tenderness.  Psoas left, muscular hypertonicity.        Segmental Joint(s): Segmental joint dysfunction was assessed with motion palpation and is identified in the following areas:  Cervical : C5  Thoracic : T3, T4, T7, T8, and T12  Lumbopelvic / Sacral SIJ : L1, L4, L5/S1, R SIJ, and L SIJ      Assessment/Plan   Today's  Treatment Included: Chiropractic manipulation to the Segmental Joint(s) Cervical : C5 Segmental Joint(s) Lumbopelvic/Sacral SIJ : L1, L4, L5/S1, R SIJ, and L SIJ Segmental Joint(s) Thoracic : T3, T4, T7, T8, and T12   Treatment Techniques Used : Direct Non-force technique, Activator/Tool assisted technique, Pelvic blocking technique, and Low force    Soft-tissue mobilization was performed in the following areas:   Cervical paraspinal mm. bilateral and Upper Trapezius bilateral  Thoracic Paraspinal mm. bilateral, Rhomboids bilateral, and Pectoralis bilateral  Lumbar Paraspinal mm. bilateral, Quadratus Lumborum bilateral, Gluteal mm. Glute. Med. bilateral, and Psoas bilateral    F/U as needed and beneficial     The patient tolerated today's treatment with little or no additional discomfort and was instructed to contact the office for questions or concerns.

## 2025-08-06 NOTE — PROGRESS NOTES
Acupuncture Visit:     Subjective   Patient ID: Taylor Kerns is a 75 y.o. female who presents for No chief complaint on file.  2025 Acupuncture Benefits   05/28/2025 RA   1.Medicare A & B # [id number]   Limitations (Visits, etc.): 12 visits in 90 days + add 8 days if improve   Supervising Medicare Provider:   Covered Diagnosis: Chronic low back pain   Supervising Medicare Provider:  Jason   Ref #: Availity Transaction ID [id number]     Pager and [facility] remedy   LBP - comes and goes, manageable  L hip pain , more severe right now  Pain is worse in the AM, stiff  Some anxiety right now, this impacts her pain    prev  Pt. Needs a pager for tx  [facility] Rem[facility]     Lower back pain is on and off, back is sore in the AM, getting better as the day goes on  L hip pain has improved overall since her acu tx    Other low back pain:  Mostly in the morning  Disc issues in Lumbar area    L5/S1 - most compromised area, most painful area   Fairly active, pain subsides after she does some activity (piliates, yoga, swim)  3-4/10 lower back pain daily  Pain is worse in the morning, does yoga or piliates and pain improves, pain worse again at night  Bending forward aggravates her pain  Uses heat, feels good  Takes meloxicam as needed, notes  L hip pain daily as well as LBP  5-6/10   L hip pain radiates down to toes at times   Gets chiro care, HX of PT    Previous (2023)  States she has been in more pain due to not coming in for regular acupuncture and chiropractic,  Also in crease pain as she has not been sleeping well, weaning off antidepressant.  States she will be going to [place] in 2 weeks for the winter.        Session Information  Is this acupuncture treatment being billed to the patient's insurance company: Yes  This is visit number: 4  The patient has a total number of visits of: 12  Initial Acupuncture Treatment date: 08/21/23  Name of Supervising Physician: jason  Visit Type: Follow-up visit         Review  of Systems         Provider reviewed plan for the acupuncture session, precautions and contraindications. Patient/guardian/hospital staff has given consent to treat with full understanding of what to expect during the session. Before acupuncture began, provider explained to the patient to communicate at any time if the procedure was causing discomfort past their tolerance level. Patient agreed to advise acupuncturist. The acupuncturist counseled the patient on the risks of acupuncture treatment including pain, infection, bleeding, and no relief of pain. The patient was positioned comfortably. There was no evidence of infection at the site of needle insertions.    Objective   Physical Exam              Acupuncture Treatment  Patient Position: Supine on a table  Acupuncture Needling: Yes  Needle Guage: 42 guage /.14/ Lime green seirin  Body Points: With retention  Body Points - Left: 88.12 88.13 88.14  Body Points - Bilateral: DU20 GB40 GB43 BL60 - HT7 LU9 PC6  Body Points - Right: 44.05 44.06 44.07  Other Techniques Utilized: TDP Lamp  TDP Lamp Descripton: feet (no moxa)  Needle Count In: 19  Needle Count Out: 19  Total Face to Face Time (min): 25 minutes              Assessment/Plan

## 2025-08-07 ENCOUNTER — TELEPHONE (OUTPATIENT)
Dept: DERMATOLOGY | Facility: CLINIC | Age: 75
End: 2025-08-07
Payer: MEDICARE

## 2025-08-07 RX ORDER — TRETINOIN 1 MG/G
CREAM TOPICAL NIGHTLY
Qty: 45 G | Refills: 11 | Status: CANCELLED | OUTPATIENT
Start: 2025-08-07

## 2025-08-07 RX ORDER — TRETINOIN 1 MG/G
1 CREAM TOPICAL NIGHTLY
Qty: 45 G | Refills: 11 | Status: SHIPPED | OUTPATIENT
Start: 2025-08-07

## 2025-08-08 ENCOUNTER — APPOINTMENT (OUTPATIENT)
Dept: PRIMARY CARE | Facility: CLINIC | Age: 75
End: 2025-08-08
Payer: MEDICARE

## 2025-08-08 VITALS
BODY MASS INDEX: 18.21 KG/M2 | DIASTOLIC BLOOD PRESSURE: 78 MMHG | OXYGEN SATURATION: 97 % | HEART RATE: 60 BPM | SYSTOLIC BLOOD PRESSURE: 116 MMHG | WEIGHT: 112.8 LBS

## 2025-08-08 DIAGNOSIS — L65.8 FEMALE PATTERN HAIR LOSS: ICD-10-CM

## 2025-08-08 DIAGNOSIS — L57.8 DIFFUSE PHOTODAMAGE OF SKIN: ICD-10-CM

## 2025-08-08 DIAGNOSIS — E78.5 DYSLIPIDEMIA: ICD-10-CM

## 2025-08-08 DIAGNOSIS — C80.1 ANXIETY ASSOCIATED WITH CANCER DIAGNOSIS: ICD-10-CM

## 2025-08-08 DIAGNOSIS — F41.1 GENERALIZED ANXIETY DISORDER: ICD-10-CM

## 2025-08-08 DIAGNOSIS — E03.8 SUBCLINICAL HYPOTHYROIDISM: ICD-10-CM

## 2025-08-08 DIAGNOSIS — F33.2 MAJOR DEPRESSIVE DISORDER, RECURRENT SEVERE WITHOUT PSYCHOTIC FEATURES (MULTI): ICD-10-CM

## 2025-08-08 DIAGNOSIS — F41.1 ANXIETY ASSOCIATED WITH CANCER DIAGNOSIS: ICD-10-CM

## 2025-08-08 DIAGNOSIS — Z00.00 MEDICARE ANNUAL WELLNESS VISIT, SUBSEQUENT: Primary | ICD-10-CM

## 2025-08-08 DIAGNOSIS — Z78.0 POSTMENOPAUSAL: ICD-10-CM

## 2025-08-08 PROCEDURE — 1170F FXNL STATUS ASSESSED: CPT | Performed by: STUDENT IN AN ORGANIZED HEALTH CARE EDUCATION/TRAINING PROGRAM

## 2025-08-08 PROCEDURE — 1126F AMNT PAIN NOTED NONE PRSNT: CPT | Performed by: STUDENT IN AN ORGANIZED HEALTH CARE EDUCATION/TRAINING PROGRAM

## 2025-08-08 PROCEDURE — G0439 PPPS, SUBSEQ VISIT: HCPCS | Performed by: STUDENT IN AN ORGANIZED HEALTH CARE EDUCATION/TRAINING PROGRAM

## 2025-08-08 PROCEDURE — 1159F MED LIST DOCD IN RCRD: CPT | Performed by: STUDENT IN AN ORGANIZED HEALTH CARE EDUCATION/TRAINING PROGRAM

## 2025-08-08 PROCEDURE — 1123F ACP DISCUSS/DSCN MKR DOCD: CPT | Performed by: STUDENT IN AN ORGANIZED HEALTH CARE EDUCATION/TRAINING PROGRAM

## 2025-08-08 PROCEDURE — 99213 OFFICE O/P EST LOW 20 MIN: CPT | Performed by: STUDENT IN AN ORGANIZED HEALTH CARE EDUCATION/TRAINING PROGRAM

## 2025-08-08 RX ORDER — TRETINOIN 1 MG/G
1 CREAM TOPICAL NIGHTLY
Qty: 45 G | Refills: 11 | Status: CANCELLED | OUTPATIENT
Start: 2025-08-08

## 2025-08-08 RX ORDER — CLOBETASOL PROPIONATE 0.5 MG/ML
SOLUTION TOPICAL
COMMUNITY

## 2025-08-08 RX ORDER — MELOXICAM 15 MG/1
1 TABLET ORAL
COMMUNITY
Start: 2025-04-14

## 2025-08-08 RX ORDER — HYDROCORTISONE 25 MG/G
CREAM TOPICAL
COMMUNITY
Start: 2025-03-13

## 2025-08-08 ASSESSMENT — ACTIVITIES OF DAILY LIVING (ADL)
BATHING: INDEPENDENT
MANAGING_FINANCES: INDEPENDENT
TAKING_MEDICATION: INDEPENDENT
DRESSING: INDEPENDENT
GROCERY_SHOPPING: INDEPENDENT
DOING_HOUSEWORK: INDEPENDENT

## 2025-08-08 ASSESSMENT — PATIENT HEALTH QUESTIONNAIRE - PHQ9
9. THOUGHTS THAT YOU WOULD BE BETTER OFF DEAD, OR OF HURTING YOURSELF: NOT AT ALL
4. FEELING TIRED OR HAVING LITTLE ENERGY: SEVERAL DAYS
2. FEELING DOWN, DEPRESSED OR HOPELESS: SEVERAL DAYS
5. POOR APPETITE OR OVEREATING: SEVERAL DAYS
7. TROUBLE CONCENTRATING ON THINGS, SUCH AS READING THE NEWSPAPER OR WATCHING TELEVISION: NOT AT ALL
1. LITTLE INTEREST OR PLEASURE IN DOING THINGS: SEVERAL DAYS
3. TROUBLE FALLING OR STAYING ASLEEP OR SLEEPING TOO MUCH: NOT AT ALL
SUM OF ALL RESPONSES TO PHQ9 QUESTIONS 1 AND 2: 2
SUM OF ALL RESPONSES TO PHQ QUESTIONS 1-9: 5
6. FEELING BAD ABOUT YOURSELF - OR THAT YOU ARE A FAILURE OR HAVE LET YOURSELF OR YOUR FAMILY DOWN: SEVERAL DAYS
8. MOVING OR SPEAKING SO SLOWLY THAT OTHER PEOPLE COULD HAVE NOTICED. OR THE OPPOSITE, BEING SO FIGETY OR RESTLESS THAT YOU HAVE BEEN MOVING AROUND A LOT MORE THAN USUAL: NOT AT ALL

## 2025-08-08 ASSESSMENT — ENCOUNTER SYMPTOMS
OCCASIONAL FEELINGS OF UNSTEADINESS: 0
DEPRESSION: 1
LOSS OF SENSATION IN FEET: 0

## 2025-08-08 ASSESSMENT — PAIN SCALES - GENERAL: PAINLEVEL_OUTOF10: 0-NO PAIN

## 2025-08-08 NOTE — PROGRESS NOTES
Subjective     Taylor Kerns is a 75 y.o. female who presents for the following: Discuss recent biopsy result and treatment options and Rash.  Biopsy of 2 suspicious lesions performed at her last visit in our office on 5/16/25 revealed an actinic keratosis on her left superior nasal sidewall and a lentigo on her right anterior mid leg.    Today, the patient states the biopsy sites healed well.  Since her last visit, she states she has noticed worsening red, irritated, itchy areas in the crease of her buttocks.  She denies any other areas of involvement.  She denies any other new, changing, or concerning skin lesions since her last visit; no bleeding, itching, or burning lesions.      Review of Systems:  No other skin or systemic complaints other than what is documented elsewhere in the note.    The following portions of the chart were reviewed this encounter and updated as appropriate:       Skin Cancer History  Biopsy Log Book  Biopsied Type Location Status   7/1/24 SCC in Situ Left Nasal Dorsum Treatment Complete - Mohs  7/17/25 7/1/24 SCC in Situ Left Anterior Shoulder Treatment Complete - ED&C  7/17/25       Additional History      Specialty Problems          Dermatology Problems    Actinic keratosis    Basal cell carcinoma of skin of other part of trunk    Basal cell carcinoma of skin of right upper limb, including shoulder    Melanocytic nevi of scalp and neck    Neoplasm of uncertain behavior of skin    Other hypertrophic disorders of the skin    Other melanin hyperpigmentation    Other viral warts    Skin changes due to chronic exposure to nonionizing radiation, unspecified       Past Dermatologic / Past Relevant Medical History:    - 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 Gallagher on 9/26/24 and ED&C on 8/26/24, respectively  - BCC on right nasal ala diagnosed at initial visit on 11/2/20 s/p Mohs  surgery by Dr. Adam on 11/20/20  - 2 BCCs on left upper back and left thigh both diagnosed by Dr. Dickinson on 6/26/23 and both s/p ED&C by Dr. 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    A skin examination was performed including: Face, neck, and bilateral buttocks. All findings within normal limits unless otherwise noted below.    Assessment/Plan   Skin Exam  1. ACTINIC KERATOSIS  Nose  On the patient's left superior nasal sidewall, there is a pink, well-healed scar at the recent biopsy site with a surrounding rim of erythema, grittiness, and scale  Biopsy-proven Actinic Keratosis -left superior nasal sidewall, present on the deep and peripheral margins.  The pre-cancerous nature of this lesion, its presence on the margin(s), and treatment options were discussed with the patient today.  At this time, I recommend treatment with liquid nitrogen cryotherapy.  The patient expressed understanding, is in agreement with this plan, and wishes to proceed with cryotherapy today.  - Destr of lesion - Nose  Complexity: simple    Destruction method: cryotherapy    Informed consent: discussed and consent obtained    Lesion destroyed using liquid nitrogen: Yes    Region frozen until ice ball extended beyond  lesion: Yes    Cryotherapy cycles:  1  Outcome: patient tolerated procedure well with no complications    Post-procedure details: wound care instructions given      2. INTERTRIGO  Generalized  In the patient's intergluteal cleft, mainly the superior intergluteal cleft, there are red, moist, slightly scaly patches located in skin folds.  Intertrigo - flare in intergluteal cleft.  The potentially chronic and intermittently flaring nature of this condition, which likely involves colonization with Candidal yeast, and treatment options were discussed extensively with the patient today.  At this time, I recommend combination topical anti-fungal and topical steroid therapy with Ketoconazole 2% and Triamcinolone 0.025% creams, respectively, which the patient was instructed to apply together twice daily to the affected areas of her buttocks (avoid face and groin area) for the next 2 weeks, followed by taper to twice daily on weekends only for persistent areas with use of over-the-counter Zeasorb AF powder once daily for maintenance; the patient may repeat treatment in a similar 2-week burst-and-taper fashion every 6-8 weeks as needed for future flares.  The risks, benefits, and side effects of these medications, including possible skin atrophy with overuse of topical steroids, were discussed.  The patient expressed understanding and is in agreement with this plan.  - triamcinolone (Kenalog) 0.025 % cream - Apply twice daily to affected areas of body (avoid face, groin, body folds) for 2 weeks, then taper to twice daily on weekends only; repeat every 6-8 weeks as needed for flares  3. XEROSIS CUTIS  Generalized  Diffuse generalized dry, scaly skin.  Xerosis.  I emphasized the importance of dry, sensitive skin care, including the use of a mild soap, such as Dove, and frequent and aggressive moisturization, at least twice daily and immediately following showers or baths, with recommended over-the-counter moisturizing creams,  such as Eucerin, Cetaphil, Cerave, or Aveeno, or Vaseline or Aquaphor ointments.  4. LENTIGO  Photodistributed  Multiple tan- to light brown-colored, round- to oval-shaped, symmetric and uniform-appearing macules and small patches consistent with lentigines scattered in sun-exposed areas.  Solar Lentigines and photodamage.  The clinically benign-appearing nature of these lesions and their relation to chronic sun exposure were discussed with the patient today and reassurance provided.  None of these lesions meet threshold for biopsy today, and thus no treatment is medically indicated for these lesions at this time.  The signs and symptoms of skin cancer were reviewed and the patient was advised to practice sun protection and sun avoidance, use daily sunscreen, and perform regular self skin exams.  The patient was instructed to monitor these lesions for any changes, such as in size, shape, or color, or associated symptoms and to call our office to schedule a return visit for re-evaluation if any such changes or symptoms are noticed in the future.  The patient expressed understanding and is in agreement with this plan.  5. DIFFUSE PHOTODAMAGE OF SKIN  Photodistributed  Diffuse photodamage with actinic changes with telangiectasia and mottled pigmentation in sun-exposed areas.  History of several nonmelanoma skin cancers, dysplastic nevus, and actinic keratoses and photodamage.  The patient was recently seen in our office for routine follow-up and skin exam on 5/16/25, at which time no evidence of recurrence was noted.  The signs and symptoms of skin cancer were reviewed and the patient was advised to practice sun protection and sun avoidance, use daily sunscreen, and perform regular self skin exams.  I will have the patient return to our office in 6 months from the date of her last visit for routine follow-up and skin exam, and the patient was instructed to call our office should the patient notice any new, changing,  symptomatic, or otherwise concerning skin lesions before then.    In addition, the patient wished to discuss possible medical treatment for photoaging.  Thus, at this time, I recommend the patient begin topical retinoid therapy with Tretinoin 0.1% cream at night.  The risks, benefits, and side effects of this medication, including the redness, dryness, and irritation expected with its use, were discussed; the patient was instructed to begin use of Tretinoin 1-2 nights per week and increase to every other night, then every night as tolerated without excessive dryness and irritation.  The patient was also informed this medication will likely be considered cosmetic and thus may not be covered by their insurance company and require out-of-pocket payment.  The patient expressed understanding and is in agreement with this plan.  - tretinoin (Retin-A) 0.1 % cream - Photodistributed - Apply 1 Application topically once daily at bedtime. Start 1-2 nights per week 1-2 weeks, inc to every other night, then every night as tolerated            [1]   Current Outpatient Medications:     ARIPiprazole (Abilify) 2 mg tablet, TAKE TWO TABLETS BY MOUTH ONE TIME DAILY, Disp: 180 tablet, Rfl: 1    hydroquinone 4 % cream, Apply as directed Basilio Hernandez MD, Disp: , Rfl:     ketoconazole (NIZOral) 2 % cream, Apply twice daily to affected areas of face, Disp: 60 g, Rfl: 11    minoxidil (Rogaine) 2 % external solution, Apply topically 2 times a day., Disp: 180 mL, Rfl: 1    MULTIVITAMIN ORAL, Take by mouth., Disp: , Rfl:     tretinoin (Retin-A) 0.1 % cream, Apply topically once daily at bedtime., Disp: , Rfl:     busPIRone (Buspar) 5 mg tablet, Take 1 tablet (5 mg) by mouth 2 times a day., Disp: 180 tablet, Rfl: 1    LORazepam (Ativan) 1 mg tablet, Take 0.5 tablets (0.5 mg) by mouth 2 times a day as needed for anxiety. Do not fill before November 22, 2024., Disp: 30 tablet, Rfl: 2    mirtazapine (Remeron) 7.5 mg tablet, Take 1 tablet (7.5 mg)  by mouth once daily at bedtime., Disp: 90 tablet, Rfl: 1    spironolactone (Aldactone) 50 mg tablet, Take 1 tablet (50 mg) by mouth once daily., Disp: 90 tablet, Rfl: 1    tretinoin (Retin-A) 0.1 % cream, Apply 1 Application topically once daily at bedtime. Start 1-2 nights per week 1-2 weeks, inc to every other night, then every night as tolerated, Disp: 45 g, Rfl: 11    triamcinolone (Kenalog) 0.025 % cream, Apply twice daily to affected areas of body (avoid face, groin, body folds) for 2 weeks, then taper to twice daily on weekends only; repeat every 6-8 weeks as needed for flares, Disp: 80 g, Rfl: 1

## 2025-08-08 NOTE — PROGRESS NOTES
Taylor Kerns is a 75 y.o. female [place] at [facility] [place] Noble Llanos on 08/08/25 for routine care, as well as for management of the following chronic medical conditions: TELMA, insomnia, DLD, subclinical hypothyroidism (positive Ab), BCC, SCC, endometrial cancer s/p NATALIYA, BSO, knee pain. Patient presents today for CPE/MCW visit.     CHRONIC MEDICAL ISSUES:    #TELMA, Insomnia  - previously trialed on sertraline, fluoxetine, wellbutrin in the past without efficacy and with intolerances like increased anxiety or activation   - follows with psychiatry, Dr. Mcgrath at [facility]  - current regimen: Abilify 4mg, Remeron 3.75mg at bedtime (1/2 tab 7.5), Lorazepam 0.5mg BID PRN   [ ] discussed possible titration of remeron to 7.5mg dosing, discussed benefits including weight gain and augmenting effect of Abilify   [ ] ongoing therapy      #DLD  - ASCVD 11%, CAC score 0 9/2022  - lipid panel improved from prior but persistently elevated   - Abilify likely contributing   - qualifies for statin, but declines for now, reasonable given CAC scoring 0  [  ] annual lipids  [  ] consider LPA assessment  [  ] CAC scoring updated 2027     # Endometrial ca s/p NATALIYA BSO  - follows with gyn/onc (Dr. Jackman at [facility])     #BCC  #SCC  - follows with dermatologyNurys     #External hemorrhoids, internal hemorrhoids s/p banding   - banding completed in [place] 2/2023 during C-scope at [facility] (Dr. Bradley Dumont)  - no blood in stools, constipation, abdominal pain   [  ] per c-scope records reviewed, last 02/2023 with plans for repeat in 3-5 years (unable to see pathology report to discern optimal timing); patient to investigate     #IBS  - no issues with abdominal pain, diarrhea, constipation  - off lubipristone now    #Knee pain s/p MRI (complex meniscal tear and cartilage loss)  - stable     #Interval hair loss  - recent screening labs WNL  - prior topical Minoxidil trial; has deferred  Aldactone     #Subclinical hypothyroidism  - positive TPO antibody  - recent TSH reassuring  [  ] CTM  [  ] could consider low dose Thyroid hormone replacement, she may consider     Subspecialty Medical Care: psychiatry, dermatology, gyn/onc    Past Surgical History:   Past Surgical History:   Procedure Laterality Date    HYSTERECTOMY  January 2018    MOHS SURGERY      OTHER SURGICAL HISTORY  10/16/2019    Hysterectomy robotic    SKIN BIOPSY       Medications:   Current Outpatient Medications:     ARIPiprazole (Abilify) 2 mg tablet, TAKE TWO TABLETS BY MOUTH ONE TIME DAILY, Disp: 180 tablet, Rfl: 1    hydrocortisone 2.5 % cream, , Disp: , Rfl:     ketoconazole (NIZOral) 2 % cream, Apply twice daily to affected areas of face, Disp: 60 g, Rfl: 11    meloxicam (Mobic) 15 mg tablet, Take 1 tablet (15 mg) by mouth early in the morning.., Disp: , Rfl:     MULTIVITAMIN ORAL, Take by mouth., Disp: , Rfl:     tretinoin (Retin-A) 0.1 % cream, Apply topically once daily at bedtime., Disp: , Rfl:     tretinoin (Retin-A) 0.1 % cream, Apply 1 Application topically once daily at bedtime. Start 1-2 nights per week 1-2 weeks, inc to every other night, then every night as tolerated, Disp: 45 g, Rfl: 11    triamcinolone (Kenalog) 0.025 % cream, Apply twice daily to affected areas of body (avoid face, groin, body folds) for 2 weeks, then taper to twice daily on weekends only; repeat every 6-8 weeks as needed for flares, Disp: 80 g, Rfl: 1    clobetasol (Temovate) 0.05 % external solution, APPLY SOLUTION TO SCALP EVERY NIGHT AT BEDTIME FOR 1 WEEK ON, THEN 1 WEEK OFF. REPEAT CYCLE AS NEEDED., Disp: , Rfl:     LORazepam (Ativan) 1 mg tablet, Take 0.5 tablets (0.5 mg) by mouth 2 times a day as needed for anxiety. Do not fill before November 22, 2024., Disp: 30 tablet, Rfl: 2    mirtazapine (Remeron) 7.5 mg tablet, Take 1 tablet (7.5 mg) by mouth once daily at bedtime., Disp: 90 tablet, Rfl: 1  Pharmacy: [facility] in [place]    Allergies:    Allergies   Allergen Reactions    Amoxicillin Diarrhea and Hives     Family History:   Grandfather - bone cancer   Maternal grandmother - heart disease, diverticulosis  Father - Lewy Body dementia  Paternal grandmother - Alzheimer's  Family History   Problem Relation Name Age of Onset    Suicidality Paternal Grandfather      Depression Father Ag     Depression Mother Ag      Social History:   Home/Living Situation/Falls/Safety Assessment: lives in [place] 8 months of the year and [place] for the summer  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, retired 2019  Activities: exercise, walk, run, Pilates, reading  Drug Use: no smoking, no drugs, no alcohol  Diet: very healthy  Depression/Anxiety: as above, follows with psych   Sexuality/Contraception/Menstrual History: s/p NATALIYA/BSO  Sleep: on Remeron as above     Patient Information:  Health Insurance: [employer]  Transportation: car  Healthcare POA/Guardian: Son  Contact Information: correct in EMR    Visit Vitals  /78 (BP Location: Left arm, Patient Position: Sitting, BP Cuff Size: Adult)   Pulse 60   Wt 51.2 kg (112 lb 12.8 oz)   SpO2 97%   BMI 18.21 kg/m²   OB Status Postmenopausal   Smoking Status Former   BSA 1.54 m²      PHYSICAL EXAM:   General: well appearing  female, NAD, pleasant and engaged in encounter    HEENT: NCAT, MMM  CV: RRR, no m/r/g  PULM: CTAB, non-labored respirations   ABD: soft, thin, NT, ND  EXT: WWP, no significant edema   SKIN: innumerous skin lesions for which she undergoes close surveillance with dermatology given SCC and BCC history   NEURO: A&Ox4, symmetric facies, no gross motor or sensory deficits, normal gait  PSYCH: pleasant mood, appropriate affect     Assessment/Plan    Taylor Kerns is a 75 y.o. year old female patient [place] [facility] [place]lan[place] on [place]08/25 for routine care, as well as for management of the following chronic medical conditions: TELMA,  insomnia, DLD, subclinical hypothyroidism (positive Ab), BCC, SCC, endometrial cancer s/p NATALIYA, BSO, knee pain. Patient presents today for CPE/MCW visit.     CHRONIC MEDICAL ISSUES:    #TELMA, Insomnia  - previously trialed on sertraline, fluoxetine, wellbutrin in the past without efficacy and with intolerances like increased anxiety or activation   - follows with psychiatry, Dr. Mcgrath at [facility]  - current regimen: Abilify 4mg, Remeron 3.75mg at bedtime (1/2 tab 7.5), Lorazepam 0.5mg BID PRN   [ ] discussed possible titration of remeron to 7.5mg dosing, discussed benefits including weight gain and augmenting effect of Abilify   [ ] ongoing therapy      #DLD  - ASCVD 11%, CAC score 0 9/2022  - lipid panel improved from prior but persistently elevated   - Abilify likely contributing   - qualifies for statin, but declines for now, reasonable given CAC scoring 0  [  ] annual lipids  [  ] consider LPA assessment  [  ] CAC scoring updated 2027     # Endometrial ca s/p NATALIYA BSO  - follows with gyn/onc (Dr. Jackman at [facility])     #BCC  #SCC  - follows with dermatology, Nurys     #External hemorrhoids, internal hemorrhoids s/p banding   - banding completed in [place] 2/2023 during C-scope at [facility] (Dr. Bradley Dumont)  - no blood in stools, constipation, abdominal pain   [  ] per c-scope records reviewed, last 02/2023 with plans for repeat in 3-5 years (unable to see pathology report to discern optimal timing); patient to investigate     #IBS  - no issues with abdominal pain, diarrhea, constipation  - off lubipristone now    #Knee pain s/p MRI (complex meniscal tear and cartilage loss)  - stable     #Interval hair loss  - recent screening labs WNL  - prior topical Minoxidil trial; has deferred Aldactone     #Subclinical hypothyroidism  - positive TPO antibody  - recent TSH reassuring  [  ] CTM  [  ] could consider low dose Thyroid hormone replacement, she may consider     #Health  Maintenance  CPE/MCW Visit: 8/8/2025    Cancer Screening  - Cervical Cancer Screening: s/p NATALIYA/BSO  - Mammography: due 10/2025, ordered   - Colorectal Cancer Screening: will obtain C-scope records from hospital in [place]; 02/2023 (repeat in 3-5 years (unclear per pathology); advised to look into to clarify   - Lung Cancer Screening: quit smoking > 50 years ago, not eligible    Laboratory Screening  - Lipid Screen: DLD as above   - ASCVD Score: 11%; declines based on CAC 0 in 2022; repeat in 2027  - A1C, glucose screen: A1C 5.9% in 07/2025   - STI, HIV, Hep B screen: defer  - Hep C screen: defer    Imaging Screening  - Osteoporosis/DEXA screening: reassuring 09/2024; repeat due 09/2026 (below treatment threshold)     Immunizations:   - Influenza: recommended annually   - COVID: recommend staying UTD with boosters  - RSV: advise at 75   - Tdap: completed 2016, due 2026  - Prevnar, Pneumovax: completed (last 2022)   - Shingrix: series complete    Other Screening  - Health Literacy Assessment: good  - Depression screen: as above  - Home safety/partner violence screen: negative  - Hearing/Vision screens: negative  - Alcohol/tobacco/drug use screen: negative  - Healthcare POA/Advanced Directives: son     Patient Discussion:    Please call back the office with any questions at [phone]. In the case of an emergency, please call [phone] or go to the nearest Emergency Department.      Noble Llanos MD  Internal Medicine-Pediatrics  [facility] [address], [address]  P: [phone], F: [phone]

## 2025-08-09 ENCOUNTER — OFFICE VISIT (OUTPATIENT)
Dept: OPHTHALMOLOGY | Facility: CLINIC | Age: 75
End: 2025-08-09
Payer: MEDICARE

## 2025-08-09 DIAGNOSIS — Z96.1 PSEUDOPHAKIA OF BOTH EYES: ICD-10-CM

## 2025-08-09 DIAGNOSIS — H52.4 REGULAR ASTIGMATISM OF BOTH EYES WITH PRESBYOPIA: ICD-10-CM

## 2025-08-09 DIAGNOSIS — H26.493 PCO (POSTERIOR CAPSULAR OPACIFICATION), BILATERAL: ICD-10-CM

## 2025-08-09 DIAGNOSIS — H35.373 EPIRETINAL MEMBRANE (ERM) OF BOTH EYES: Primary | ICD-10-CM

## 2025-08-09 DIAGNOSIS — H52.223 REGULAR ASTIGMATISM OF BOTH EYES WITH PRESBYOPIA: ICD-10-CM

## 2025-08-09 PROBLEM — H26.492 PCO (POSTERIOR CAPSULAR OPACIFICATION), LEFT: Status: ACTIVE | Noted: 2025-08-09

## 2025-08-09 PROCEDURE — 92004 COMPRE OPH EXAM NEW PT 1/>: CPT | Performed by: STUDENT IN AN ORGANIZED HEALTH CARE EDUCATION/TRAINING PROGRAM

## 2025-08-09 PROCEDURE — 92134 CPTRZ OPH DX IMG PST SGM RTA: CPT | Performed by: STUDENT IN AN ORGANIZED HEALTH CARE EDUCATION/TRAINING PROGRAM

## 2025-08-09 PROCEDURE — 92015 DETERMINE REFRACTIVE STATE: CPT | Performed by: STUDENT IN AN ORGANIZED HEALTH CARE EDUCATION/TRAINING PROGRAM

## 2025-08-09 ASSESSMENT — REFRACTION_MANIFEST
OS_SPHERE: +0.25
OD_SPHERE: PLANO
OD_ADD: +2.50
METHOD_AUTOREFRACTION: 1
OD_SPHERE: +0.25
OS_CYLINDER: -1.00
OD_CYLINDER: -1.25
OD_AXIS: 090
OD_CYLINDER: -1.50
OS_SPHERE: -0.25
OD_AXIS: 110
OS_AXIS: 095
OS_AXIS: 105
OS_ADD: +2.50
OS_CYLINDER: -0.75

## 2025-08-09 ASSESSMENT — TONOMETRY
OS_IOP_MMHG: 16
OD_IOP_MMHG: 16
IOP_METHOD: GOLDMANN APPLANATION

## 2025-08-09 ASSESSMENT — CONF VISUAL FIELD
OS_SUPERIOR_TEMPORAL_RESTRICTION: 0
METHOD: COUNTING FINGERS
OD_INFERIOR_NASAL_RESTRICTION: 0
OS_SUPERIOR_NASAL_RESTRICTION: 0
OS_INFERIOR_TEMPORAL_RESTRICTION: 0
OD_NORMAL: 1
OD_SUPERIOR_NASAL_RESTRICTION: 0
OD_SUPERIOR_TEMPORAL_RESTRICTION: 0
OS_INFERIOR_NASAL_RESTRICTION: 0
OD_INFERIOR_TEMPORAL_RESTRICTION: 0
OS_NORMAL: 1

## 2025-08-09 ASSESSMENT — EXTERNAL EXAM - LEFT EYE: OS_EXAM: NORMAL

## 2025-08-09 ASSESSMENT — VISUAL ACUITY
METHOD: SNELLEN - LINEAR
OS_SC: 20/25-2
OD_SC: 20/20-2

## 2025-08-09 ASSESSMENT — EXTERNAL EXAM - RIGHT EYE: OD_EXAM: NORMAL

## 2025-08-09 ASSESSMENT — ENCOUNTER SYMPTOMS
CARDIOVASCULAR NEGATIVE: 0
MUSCULOSKELETAL NEGATIVE: 0
EYES NEGATIVE: 1
CONSTITUTIONAL NEGATIVE: 0
ENDOCRINE NEGATIVE: 0
ALLERGIC/IMMUNOLOGIC NEGATIVE: 0
RESPIRATORY NEGATIVE: 0
NEUROLOGICAL NEGATIVE: 0
GASTROINTESTINAL NEGATIVE: 0
HEMATOLOGIC/LYMPHATIC NEGATIVE: 0
PSYCHIATRIC NEGATIVE: 0

## 2025-08-09 ASSESSMENT — SLIT LAMP EXAM - LIDS
COMMENTS: MILD MGD
COMMENTS: MILD MGD

## 2025-08-09 ASSESSMENT — CUP TO DISC RATIO
OD_RATIO: .30
OS_RATIO: .30

## 2025-08-09 NOTE — PROGRESS NOTES
Assessment/Plan   Diagnoses and all orders for this visit:  Epiretinal membrane (ERM) of both eyes  -non visually significant noted on DFE and MAC OCT today  -pt ed; monitor annually   PCO (posterior capsular opacification), bilateral  Pseudophakia of both eyes  -monitor; mild PCO outside of visual axis  Regular astigmatism of both eyes with presbyopia  -patient not currently wearing specs but having some difficulty with night driving glare and seeing at distance (TV subtitles)  -New Rx for glasses given per patient request. Patient's signature obtained to acknowledge and confirm that a paper copy of glasses Rx was given to patient in compliance with [facility] Eyeglass Rule. Electronic copy of Rx will also be available via [employer].     RTC 1 year for annual with DFE and MAC OCT

## 2025-08-27 ENCOUNTER — APPOINTMENT (OUTPATIENT)
Dept: INTEGRATIVE MEDICINE | Facility: CLINIC | Age: 75
End: 2025-08-27
Payer: MEDICARE

## 2025-08-27 DIAGNOSIS — M54.2 CERVICALGIA: ICD-10-CM

## 2025-08-27 DIAGNOSIS — G89.29 CHRONIC BILATERAL LOW BACK PAIN WITHOUT SCIATICA: ICD-10-CM

## 2025-08-27 DIAGNOSIS — M79.18 MYALGIA, OTHER SITE: ICD-10-CM

## 2025-08-27 DIAGNOSIS — M99.03 SEGMENTAL AND SOMATIC DYSFUNCTION OF LUMBAR REGION: Primary | ICD-10-CM

## 2025-08-27 DIAGNOSIS — M54.59 OTHER LOW BACK PAIN: Primary | ICD-10-CM

## 2025-08-27 DIAGNOSIS — M99.05 SEGMENTAL AND SOMATIC DYSFUNCTION OF PELVIC REGION: ICD-10-CM

## 2025-08-27 DIAGNOSIS — M54.50 CHRONIC BILATERAL LOW BACK PAIN WITHOUT SCIATICA: ICD-10-CM

## 2025-08-27 DIAGNOSIS — M99.02 SEGMENTAL AND SOMATIC DYSFUNCTION OF THORACIC REGION: ICD-10-CM

## 2025-08-27 PROCEDURE — 98941 CHIROPRACT MANJ 3-4 REGIONS: CPT | Performed by: CHIROPRACTOR

## 2025-08-27 PROCEDURE — 97811 ACUP 1/> W/O ESTIM EA ADD 15: CPT | Performed by: INTERNAL MEDICINE

## 2025-08-27 PROCEDURE — 97810 ACUP 1/> WO ESTIM 1ST 15 MIN: CPT | Performed by: INTERNAL MEDICINE

## 2025-09-22 ENCOUNTER — APPOINTMENT (OUTPATIENT)
Dept: INTEGRATIVE MEDICINE | Facility: CLINIC | Age: 75
End: 2025-09-22
Payer: MEDICARE

## 2025-09-25 ENCOUNTER — APPOINTMENT (OUTPATIENT)
Dept: INTEGRATIVE MEDICINE | Facility: CLINIC | Age: 75
End: 2025-09-25
Payer: MEDICARE

## 2025-10-20 ENCOUNTER — APPOINTMENT (OUTPATIENT)
Dept: RADIOLOGY | Facility: HOSPITAL | Age: 75
End: 2025-10-20
Payer: MEDICARE

## 2025-10-20 DIAGNOSIS — Z12.31 SCREENING MAMMOGRAM FOR BREAST CANCER: ICD-10-CM

## 2026-06-08 ENCOUNTER — APPOINTMENT (OUTPATIENT)
Dept: DERMATOLOGY | Facility: CLINIC | Age: 76
End: 2026-06-08
Payer: MEDICARE

## 2026-08-07 ENCOUNTER — APPOINTMENT (OUTPATIENT)
Dept: PRIMARY CARE | Facility: CLINIC | Age: 76
End: 2026-08-07
Payer: MEDICARE

## 2026-08-12 ENCOUNTER — APPOINTMENT (OUTPATIENT)
Dept: OPHTHALMOLOGY | Facility: CLINIC | Age: 76
End: 2026-08-12
Payer: MEDICARE